# Patient Record
Sex: FEMALE | Race: WHITE | Employment: FULL TIME | URBAN - METROPOLITAN AREA
[De-identification: names, ages, dates, MRNs, and addresses within clinical notes are randomized per-mention and may not be internally consistent; named-entity substitution may affect disease eponyms.]

---

## 2017-01-18 ENCOUNTER — TELEPHONE (OUTPATIENT)
Dept: OBGYN CLINIC | Age: 47
End: 2017-01-18

## 2017-01-18 NOTE — TELEPHONE ENCOUNTER
51year old patient last seen in the office on 4/25/2015 for AE. Patient had appointments scheduled for 4/26/16 and 10/6/16 and cancelled. Patient currently has appointment for 3/10/17 and has called an left a message requesting a refill on her Divigel. [phone] Dayanna Patel for me to refill till scheduled appointment.  Please advise

## 2017-01-19 NOTE — TELEPHONE ENCOUNTER
Patient advised of MD recommendations and was placed on the scheduled for AE 1/20/17 at 11:20 am ( ok per April). Patient verbalized understanding.

## 2017-01-20 ENCOUNTER — OFFICE VISIT (OUTPATIENT)
Dept: OBGYN CLINIC | Age: 47
End: 2017-01-20

## 2017-01-20 VITALS
BODY MASS INDEX: 29.86 KG/M2 | SYSTOLIC BLOOD PRESSURE: 118 MMHG | HEIGHT: 69 IN | DIASTOLIC BLOOD PRESSURE: 74 MMHG | WEIGHT: 201.6 LBS

## 2017-01-20 DIAGNOSIS — Z01.419 ENCOUNTER FOR GYNECOLOGICAL EXAMINATION WITHOUT ABNORMAL FINDING: Primary | ICD-10-CM

## 2017-01-20 DIAGNOSIS — Z79.890 HORMONE REPLACEMENT THERAPY: ICD-10-CM

## 2017-01-20 RX ORDER — ESTRADIOL 1 MG/G
1 GEL TOPICAL DAILY
Qty: 30 PACKET | Refills: 12 | Status: SHIPPED | OUTPATIENT
Start: 2017-01-20 | End: 2018-02-22 | Stop reason: SDUPTHER

## 2017-01-20 NOTE — PROGRESS NOTES
Kamlesh Baer is a ,  51 y.o. female [facility] whose Patient's last menstrual period was 2015. who presents for her annual checkup. She is having no significant problems. Hx LTH/BSO    Hormone Status:    She is not having vasomotor symptoms. The patient is using HRT: Yes, Divigel    Sexual history:    She  reports that she currently engages in sexual activity and has had male partners. She reports using the following method of birth control/protection: Surgical.    Medical conditions:    Since her last annual GYN exam about two years ago, she has had the following changes in her health history: none. Pap and Mammogram History:    Her most recent Pap smear was normal, HPV negative, obtained 14    The patient has her mammogram scheduled at our office in 2017. Breast Cancer History/Substance Abuse:    She has no and a family history of breast cancer. Osteoporosis History:    Family history does not include a first or second degree relative with osteopenia or osteoporosis. A bone density scan was not obtained       Past Medical History   Diagnosis Date    Anxiety 2010    Hypothyroid 2010    Ill-defined condition      depression    IUD (intrauterine device) in place 13     placed for menorrhagia    Migraine 2010    Psychotic disorder      Past Surgical History   Procedure Laterality Date    Hx left salpingectomy       ectopic    Hx tubal ligation      Pr abdomen surgery proc unlisted       laparotomy with appendectomy    Hx orthopaedic Right      heel and wrist    Hx hysterectomy  2015     Davinci LTH/BSO     Tobacco History:  reports that she has been smoking. She has a 16.50 pack-year smoking history. She has never used smokeless tobacco.  Alcohol Abuse:  reports that she drinks alcohol. Drug Abuse:  reports that she does not use illicit drugs.   Current Outpatient Prescriptions   Medication Sig Dispense Refill    DIVIGEL 1 mg (0.1 %) glpk 1 Packet by TransDERmal route daily. 90 Packet 0    levocetirizine (XYZAL) 5 mg tablet take 1 tablet by mouth once daily 90 Tab 3    levothyroxine (SYNTHROID) 75 mcg tablet take 1 tablet by mouth every morning ON AN EMPTY STOMACH 30 Tab 9    fluticasone (FLONASE) 50 mcg/actuation nasal spray 2 Sprays by Both Nostrils route daily. 1 Bottle 5     Allergies: Pcn [penicillins]   Social History     Social History    Marital status:      Spouse name: N/A    Number of children: N/A    Years of education: N/A     Occupational History    Not on file.      Social History Main Topics    Smoking status: Current Every Day Smoker     Packs/day: 0.50     Years: 33.00    Smokeless tobacco: Never Used    Alcohol use Yes      Comment: socially    Drug use: No    Sexual activity: Yes     Partners: Male     Birth control/ protection: Surgical     Other Topics Concern    Not on file     Social History Narrative     Patient Active Problem List   Diagnosis Code    Anxiety F41.9    Migraine G43.909    Hypothyroid E03.9    Menorrhagia N92.0    Endometriosis N80.9       Review of Systems - History obtained from the patient  Constitutional: negative for weight loss, fever, night sweats  HEENT: negative for hearing loss, earache, congestion, snoring, sorethroat  CV: negative for chest pain, palpitations, edema  Resp: negative for cough, shortness of breath, wheezing  GI: negative for change in bowel habits, abdominal pain, black or bloody stools  : negative for frequency, dysuria, hematuria, vaginal discharge  MSK: negative for back pain, joint pain, muscle pain  Breast: negative for breast lumps, nipple discharge, galactorrhea  Skin :negative for itching, rash, hives  Neuro: negative for dizziness, headache, confusion, weakness  Psych: negative for anxiety, depression, change in mood  Heme/lymph: negative for bleeding, bruising, pallor    Physical Exam    Visit Vitals    /74    Ht 5' 9\" (1.753 m)    Wt 201 lb 9.6 oz (91.4 kg)    LMP 05/26/2015    BMI 29.77 kg/m2     Constitutional  · Appearance: well-nourished, well developed, alert, in no acute distress    HENT  · Head and Face: appears normal    Neck  · Inspection/Palpation: normal appearance, no masses or tenderness  · Lymph Nodes: no lymphadenopathy present  · Thyroid: gland size normal, nontender, no nodules or masses present on palpation    Chest  · Respiratory Effort: breathing normal        Auscultation: normal breath sounds    Cardiovascular  · Heart:  · Auscultation: regular rate and rhythm without murmur    Breasts  · Inspection of Breasts: breasts symmetrical, no skin changes, no discharge present, nipple appearance normal, no skin retraction present  · Palpation of Breasts and Axillae: no masses present on palpation, no breast tenderness  · Axillary Lymph Nodes: no lymphadenopathy present    Gastrointestinal  · Abdominal Examination: abdomen non-tender to palpation, normal bowel sounds, no masses present  · Liver and spleen: no hepatomegaly present, spleen not palpable  · Hernias: no hernias identified    Genitourinary  · External Genitalia: normal appearance for age, no discharge present, no tenderness present, no inflammatory lesions present, no masses present, no atrophy present  · Vagina: normal vaginal vault without central or paravaginal defects, no discharge present, no inflammatory lesions present, no masses present  · Bladder: non-tender to palpation  · Urethra: appears normal  · Cervix: absent  · Uterus: absent  · Adnexa: no adnexal tenderness present, no adnexal masses present  · Perineum: perineum within normal limits, no evidence of trauma, no rashes or skin lesions present  · Anus: anus within normal limits, no hemorrhoids present  · Inguinal Lymph Nodes: no lymphadenopathy present      Skin  · General Inspection: no rash, no lesions identified    Neurologic/Psychiatric  · Mental Status:  · Orientation: grossly oriented to person, place and time  · Mood and Affect: mood normal, affect appropriate    . Assessment:  Routine gynecologic examination  Her current medical status is satisfactory with no evidence of significant gynecologic issues.     Plan:  Counseled re: diet, exercise, healthy lifestyle  Return for yearly wellness visits  Rec annual mammogram  Cont Divigel

## 2017-01-20 NOTE — PATIENT INSTRUCTIONS
Breast Self-Exam: Care Instructions  Your Care Instructions  A breast self-exam is when you check your breasts for lumps or changes. This regular exam helps you learn how your breasts normally look and feel. Most breast problems or changes are not because of cancer. Breast self-exam is not a substitute for a mammogram. Having regular breast exams by your doctor and regular mammograms improve your chances of finding any problems with your breasts. Some women set a time each month to do a step-by-step breast self-exam. Other women like a less formal system. They might look at their breasts as they brush their teeth, or feel their breasts once in a while in the shower. If you notice a change in your breast, tell your doctor. Follow-up care is a key part of your treatment and safety. Be sure to make and go to all appointments, and call your doctor if you are having problems. Its also a good idea to know your test results and keep a list of the medicines you take. How do you do a breast self-exam?  · The best time to examine your breasts is usually one week after your menstrual period begins. Your breasts should not be tender then. If you do not have periods, you might do your exam on a day of the month that is easy to remember. · To examine your breasts:  ¨ Remove all your clothes above the waist and lie down. When you are lying down, your breast tissue spreads evenly over your chest wall, which makes it easier to feel all your breast tissue. ¨ Use the pads--not the fingertips--of the 3 middle fingers of your left hand to check your right breast. Move your fingers slowly in small coin-sized circles that overlap. ¨ Use three levels of pressure to feel of all your breast tissue. Use light pressure to feel the tissue close to the skin surface. Use medium pressure to feel a little deeper. Use firm pressure to feel your tissue close to your breastbone and ribs.  Use each pressure level to feel your breast tissue before moving on to the next spot. ¨ Check your entire breast, moving up and down as if following a strip from the collarbone to the bra line, and from the armpit to the ribs. Repeat until you have covered the entire breast.  ¨ Repeat this procedure for your left breast, using the pads of the 3 middle fingers of your right hand. · To examine your breasts while in the shower:  ¨ Place one arm over your head and lightly soap your breast on that side. ¨ Using the pads of your fingers, gently move your hand over your breast (in the strip pattern described above), feeling carefully for any lumps or changes. ¨ Repeat for the other breast.  · Have your doctor inspect anything you notice to see if you need further testing. Where can you learn more? Go to http://ziyad-jl.info/. Enter P148 in the search box to learn more about \"Breast Self-Exam: Care Instructions. \"  Current as of: July 26, 2016  Content Version: 11.1  © 1543-5468 [employer]. Care instructions adapted under license by [employer] (which disclaims liability or warranty for this information). If you have questions about a medical condition or this instruction, always ask your healthcare professional. [place] [address] any warranty or liability for your use of this information.

## 2017-03-09 ENCOUNTER — OFFICE VISIT (OUTPATIENT)
Dept: FAMILY MEDICINE CLINIC | Age: 47
End: 2017-03-09

## 2017-03-09 VITALS
SYSTOLIC BLOOD PRESSURE: 102 MMHG | HEIGHT: 69 IN | OXYGEN SATURATION: 99 % | TEMPERATURE: 98 F | WEIGHT: 202.13 LBS | RESPIRATION RATE: 16 BRPM | DIASTOLIC BLOOD PRESSURE: 76 MMHG | BODY MASS INDEX: 29.94 KG/M2 | HEART RATE: 79 BPM

## 2017-03-09 DIAGNOSIS — Z00.00 WELL ADULT EXAM: Primary | ICD-10-CM

## 2017-03-09 DIAGNOSIS — E03.1 CONGENITAL HYPOTHYROIDISM WITHOUT GOITER: ICD-10-CM

## 2017-03-09 DIAGNOSIS — Z71.6 ENCOUNTER FOR SMOKING CESSATION COUNSELING: ICD-10-CM

## 2017-03-09 DIAGNOSIS — L98.9 SKIN LESION OF HAND: ICD-10-CM

## 2017-03-09 RX ORDER — VARENICLINE TARTRATE 25 MG
KIT ORAL
Qty: 1 DOSE PACK | Refills: 0 | Status: SHIPPED | OUTPATIENT
Start: 2017-03-09 | End: 2018-03-12

## 2017-03-09 RX ORDER — LEVOTHYROXINE SODIUM 75 UG/1
TABLET ORAL
Qty: 90 TAB | Refills: 3 | Status: SHIPPED | OUTPATIENT
Start: 2017-03-09 | End: 2017-04-27 | Stop reason: SDUPTHER

## 2017-03-09 RX ORDER — LEVOCETIRIZINE DIHYDROCHLORIDE 5 MG/1
TABLET, FILM COATED ORAL
Qty: 90 TAB | Refills: 3 | Status: SHIPPED | OUTPATIENT
Start: 2017-03-09 | End: 2018-03-21 | Stop reason: ALTCHOICE

## 2017-03-09 RX ORDER — VARENICLINE TARTRATE 1 MG/1
1 TABLET, FILM COATED ORAL 2 TIMES DAILY
Qty: 30 TAB | Refills: 4 | Status: SHIPPED | OUTPATIENT
Start: 2017-03-09 | End: 2018-03-12

## 2017-03-09 NOTE — PROGRESS NOTES
1. Have you been to the ER, urgent care clinic since your last visit? Hospitalized since your last visit? No    2. Have you seen or consulted any other health care providers outside of the [facility] since your last visit? Include any pap smears or colon screening. No    Chief Complaint   Patient presents with    Follow-up     med ck    Labs     Pt would also like to discuss Chantix.

## 2017-03-09 NOTE — MR AVS SNAPSHOT
Visit Information Date & Time Provider Department Dept. Phone Encounter #  
 3/9/2017  3:30 PM [address] [phone] [id number] Your Appointments 4/21/2017  2:40 PM  
MAMMOGRAPHY with MAMMOGRAM, MERVIN Olivarez ([address]) Appt Note: mammo + ae  TH/bcbs; mammo only/TH pt  
 [address] [facility] [address]  
[facility] [address] [facility] [address] Upcoming Health Maintenance Date Due Pneumococcal 19-64 Medium Risk (1 of 1 - PPSV23) 8/25/1988 DTaP/Tdap/Td series (1 - Tdap) 8/25/1990 BREAST CANCER SCRN MAMMOGRAM 4/24/2016 INFLUENZA AGE 9 TO ADULT 8/1/2016 PAP AKA CERVICAL CYTOLOGY 4/23/2019 Allergies as of 3/9/2017  Review Complete On: 3/9/2017 By: Noman Bowels, LPN Severity Noted Reaction Type Reactions Doxycycline  03/09/2017    Hives, Rash Pcn [Penicillins]  02/25/2010   Systemic Hives Current Immunizations  Never Reviewed No immunizations on file. Not reviewed this visit You Were Diagnosed With   
  
 Codes Comments Well adult exam    -  Primary ICD-10-CM: Z00.00 ICD-9-CM: V70.0 Congenital hypothyroidism without goiter     ICD-10-CM: E03.1 ICD-9-CM: 041 Encounter for smoking cessation counseling     ICD-10-CM: Z71.6, Z72.0 ICD-9-CM: V65.42, 305.1 Skin lesion of hand     ICD-10-CM: L98.9 ICD-9-CM: 709.9 Vitals BP Pulse Temp Resp Height(growth percentile) Weight(growth percentile) 102/76 79 98 °F (36.7 °C) (Oral) 16 5' 9\" (1.753 m) 202 lb 2 oz (91.7 kg) LMP SpO2 BMI OB Status Smoking Status 05/26/2015 99% 29.85 kg/m2 Hysterectomy Current Every Day Smoker Vitals History BMI and BSA Data Body Mass Index Body Surface Area  
 29.85 kg/m 2 2.11 m 2 Preferred Pharmacy Pharmacy Name Phone [facility]Y #[id number] [phone] Your Updated Medication List  
  
   
This list is accurate as of: 3/9/17  3:55 PM.  Always use your most recent med list.  
  
  
  
  
 DIVIGEL 1 mg/gram (0.1 %) Glpk Generic drug:  Estradiol 1 Packet by TransDERmal route daily. levocetirizine 5 mg tablet Commonly known as:  XYZAL  
take 1 tablet by mouth once daily  
  
 levothyroxine 75 mcg tablet Commonly known as:  SYNTHROID  
take 1 tablet by mouth every morning ON AN EMPTY STOMACH * varenicline 0.5 mg (11)- 1 mg (42) Dspk Commonly known as:  CHANTIX STARTER MOR As directed * varenicline 1 mg tablet Commonly known as:  [address] Take 1 Tab by mouth two (2) times a day. * Notice: This list has 2 medication(s) that are the same as other medications prescribed for you. Read the directions carefully, and ask your doctor or other care provider to review them with you. Prescriptions Printed Refills  
 varenicline (CHANTIX CONTINUING MONTH BOX) 1 mg tablet 4 Sig: Take 1 Tab by mouth two (2) times a day. Class: Print Route: Oral  
  
Prescriptions Sent to Pharmacy Refills  
 levothyroxine (SYNTHROID) 75 mcg tablet 3 Sig: take 1 tablet by mouth every morning ON AN EMPTY STOMACH Class: Normal  
 Pharmacy: [facility]/pharmacy #[id number] Ph #: [phone]  
 levocetirizine (XYZAL) 5 mg tablet 3 Sig: take 1 tablet by mouth once daily Class: Normal  
 Pharmacy: [facility]/pharmacy #[id number] Ph #: [phone]  
 varenicline (CHANTIX STARTER MOR) 0.5 mg (11)- 1 mg (42) DsPk 0 Sig: As directed Class: Normal  
 Pharmacy: [id number], [address] [place] [address] Ph #: [phone] We Performed the Following CBC WITH AUTOMATED DIFF [23307 CPT(R)] LIPID PANEL [25523 CPT(R)] METABOLIC PANEL, COMPREHENSIVE [60534 CPT(R)] REFERRAL TO DERMATOLOGY [REF19 Custom] Comments:  
 Please evaluate patient for right hand lesion. TSH 3RD GENERATION [47453 CPT(R)] Referral Information Referral ID Referred By Referred To  
  
 [id number] Edmundo, [address] [facility] Marjoriecurt MD Lobo   
   [address] Phone: [phone] Fax: [phone] Visits Status Start Date End Date Dhiraj Guidry 1154 Request 3/9/17 3/9/18 If your referral has a status of pending review or denied, additional information will be sent to support the outcome of this decision. Introducing [facility]! Dear Chandler Alcala: 
Thank you for requesting a [employer] account. Our records indicate that you already have an active [employer] account. You can access your account anytime at [email or web address] Did you know that you can access your hospital and ER discharge instructions at any time in [employer]? You can also review all of your test results from your hospital stay or ER visit. Additional Information If you have questions, please visit the Frequently Asked Questions section of the [employer] website at [email or web address] Remember, [employer] is NOT to be used for urgent needs. For medical emergencies, dial 911. Now available from your iPhone and Android! Please provide this summary of care documentation to your next provider. Your primary care clinician is listed as Lisa Ruiz. If you have any questions after today's visit, please call [phone].

## 2017-03-10 LAB
ALBUMIN SERPL-MCNC: 4.1 G/DL (ref 3.5–5.5)
ALBUMIN/GLOB SERPL: 2.1 {RATIO} (ref 1.1–2.5)
ALP SERPL-CCNC: 71 IU/L (ref 39–117)
ALT SERPL-CCNC: 6 IU/L (ref 0–32)
AST SERPL-CCNC: 13 IU/L (ref 0–40)
BASOPHILS # BLD AUTO: 0.1 X10E3/UL (ref 0–0.2)
BASOPHILS NFR BLD AUTO: 1 %
BILIRUB SERPL-MCNC: <0.2 MG/DL (ref 0–1.2)
BUN SERPL-MCNC: 14 MG/DL (ref 6–24)
BUN/CREAT SERPL: 17 (ref 9–23)
CALCIUM SERPL-MCNC: 8.7 MG/DL (ref 8.7–10.2)
CHLORIDE SERPL-SCNC: 102 MMOL/L (ref 96–106)
CHOLEST SERPL-MCNC: 165 MG/DL (ref 100–199)
CO2 SERPL-SCNC: 22 MMOL/L (ref 18–29)
CREAT SERPL-MCNC: 0.82 MG/DL (ref 0.57–1)
EOSINOPHIL # BLD AUTO: 0.2 X10E3/UL (ref 0–0.4)
EOSINOPHIL NFR BLD AUTO: 3 %
ERYTHROCYTE [DISTWIDTH] IN BLOOD BY AUTOMATED COUNT: 13.8 % (ref 12.3–15.4)
GLOBULIN SER CALC-MCNC: 2 G/DL (ref 1.5–4.5)
GLUCOSE SERPL-MCNC: 93 MG/DL (ref 65–99)
HCT VFR BLD AUTO: 37 % (ref 34–46.6)
HDLC SERPL-MCNC: 33 MG/DL
HGB BLD-MCNC: 12.1 G/DL (ref 11.1–15.9)
IMM GRANULOCYTES # BLD: 0 X10E3/UL (ref 0–0.1)
IMM GRANULOCYTES NFR BLD: 0 %
INTERPRETATION, 910389: NORMAL
LDLC SERPL CALC-MCNC: 96 MG/DL (ref 0–99)
LYMPHOCYTES # BLD AUTO: 2.2 X10E3/UL (ref 0.7–3.1)
LYMPHOCYTES NFR BLD AUTO: 36 %
MCH RBC QN AUTO: 29.7 PG (ref 26.6–33)
MCHC RBC AUTO-ENTMCNC: 32.7 G/DL (ref 31.5–35.7)
MCV RBC AUTO: 91 FL (ref 79–97)
MONOCYTES # BLD AUTO: 0.3 X10E3/UL (ref 0.1–0.9)
MONOCYTES NFR BLD AUTO: 5 %
NEUTROPHILS # BLD AUTO: 3.3 X10E3/UL (ref 1.4–7)
NEUTROPHILS NFR BLD AUTO: 55 %
PLATELET # BLD AUTO: 264 X10E3/UL (ref 150–379)
POTASSIUM SERPL-SCNC: 4.6 MMOL/L (ref 3.5–5.2)
PROT SERPL-MCNC: 6.1 G/DL (ref 6–8.5)
RBC # BLD AUTO: 4.08 X10E6/UL (ref 3.77–5.28)
SODIUM SERPL-SCNC: 139 MMOL/L (ref 134–144)
TRIGL SERPL-MCNC: 179 MG/DL (ref 0–149)
TSH SERPL DL<=0.005 MIU/L-ACNC: 6.49 UIU/ML (ref 0.45–4.5)
VLDLC SERPL CALC-MCNC: 36 MG/DL (ref 5–40)
WBC # BLD AUTO: 6 X10E3/UL (ref 3.4–10.8)

## 2017-03-14 NOTE — PROGRESS NOTES
Hey there, your labs look great but your thyroid is slow. Did you run out of thyroid med?   Leanne Hahn

## 2017-03-15 NOTE — PROGRESS NOTES
Subjective:   51 y.o. female for Well Woman Check. Her gyne and breast care is done elsewhere by her Ob-Gyne physician. Patient Active Problem List    Diagnosis Date Noted    Menorrhagia 05/27/2015    Endometriosis 05/27/2015    Hypothyroid 08/26/2010    Anxiety 02/25/2010    Migraine 02/25/2010     Current Outpatient Prescriptions   Medication Sig Dispense Refill    levothyroxine (SYNTHROID) 75 mcg tablet take 1 tablet by mouth every morning ON AN EMPTY STOMACH 90 Tab 3    levocetirizine (XYZAL) 5 mg tablet take 1 tablet by mouth once daily 90 Tab 3    varenicline (CHANTIX STARTER MOR) 0.5 mg (11)- 1 mg (42) DsPk As directed 1 Dose Pack 0    varenicline (CHANTIX CONTINUING MONTH BOX) 1 mg tablet Take 1 Tab by mouth two (2) times a day. 30 Tab 4    DIVIGEL 1 mg (0.1 %) glpk 1 Packet by TransDERmal route daily. 27 Packet 12     Family History   Problem Relation Age of Onset    Hypertension Mother     Migraines Mother     Kidney Disease Father      Social History   Substance Use Topics    Smoking status: Current Every Day Smoker     Packs/day: 0.50     Years: 33.00    Smokeless tobacco: Never Used    Alcohol use Yes      Comment: socially             ROS: Feeling generally well. No TIA's or unusual headaches, no dysphagia. No prolonged cough. No dyspnea or chest pain on exertion. No abdominal pain, change in bowel habits, black or bloody stools. No urinary tract symptoms. No new or unusual musculoskeletal symptoms. Specific concerns today: she wants to quit smoking with Chantix. She needs a referral to [facility] for skin lesion of the hand. Objective: The patient appears well, alert, oriented x 3, in no distress. Visit Vitals    /76    Pulse 79    Temp 98 °F (36.7 °C) (Oral)    Resp 16    Ht 5' 9\" (1.753 m)    Wt 202 lb 2 oz (91.7 kg)    LMP 05/26/2015    SpO2 99%    BMI 29.85 kg/m2     ENT normal.  Neck supple. No adenopathy or thyromegaly. JENNIFER.  Lungs are clear, good air entry, no wheezes, rhonchi or rales. S1 and S2 normal, no murmurs, regular rate and rhythm. Abdomen soft without tenderness, guarding, mass or organomegaly. Extremities show no edema, normal peripheral pulses. Neurological is normal, no focal findings. Breast and Pelvic exams are deferred. Assessment/Plan:   Well Woman  lose weight, increase physical activity, follow low fat diet, follow low salt diet, routine labs ordered  Encounter Diagnoses   Name Primary?  Well adult exam Yes    Congenital hypothyroidism without goiter     Encounter for smoking cessation counseling     Skin lesion of hand      Orders Placed This Encounter    CBC WITH AUTOMATED DIFF    LIPID PANEL    METABOLIC PANEL, COMPREHENSIVE    TSH 3RD GENERATION    CVD REPORT    REFERRAL TO DERMATOLOGY    levothyroxine (SYNTHROID) 75 mcg tablet    levocetirizine (XYZAL) 5 mg tablet    varenicline (CHANTIX STARTER MOR) 0.5 mg (11)- 1 mg (42) DsPk    varenicline (CHANTIX CONTINUING MONTH BOX) 1 mg tablet     Labs and refills updated  Given Starter and Cont pack of Chantix with directions for use  Dev plan with resultls    I have discussed the diagnosis with the patient and the intended plan as seen in the above orders. The patient has received an after-visit summary and questions were answered concerning future plans. Patient conveyed understanding of the plan at the time of the visit.     Haider Martin, MSN, ANP  3/15/2017

## 2017-04-27 RX ORDER — LEVOTHYROXINE SODIUM 75 UG/1
TABLET ORAL
Qty: 90 TAB | Refills: 0 | Status: SHIPPED | OUTPATIENT
Start: 2017-04-27 | End: 2018-01-29 | Stop reason: SDUPTHER

## 2018-01-29 ENCOUNTER — OFFICE VISIT (OUTPATIENT)
Dept: FAMILY MEDICINE CLINIC | Age: 48
End: 2018-01-29

## 2018-01-29 VITALS
HEART RATE: 71 BPM | WEIGHT: 215.25 LBS | DIASTOLIC BLOOD PRESSURE: 82 MMHG | OXYGEN SATURATION: 98 % | BODY MASS INDEX: 31.88 KG/M2 | SYSTOLIC BLOOD PRESSURE: 112 MMHG | RESPIRATION RATE: 16 BRPM | TEMPERATURE: 98.5 F | HEIGHT: 69 IN

## 2018-01-29 DIAGNOSIS — M25.512 CHRONIC LEFT SHOULDER PAIN: ICD-10-CM

## 2018-01-29 DIAGNOSIS — G89.29 CHRONIC LEFT SHOULDER PAIN: ICD-10-CM

## 2018-01-29 DIAGNOSIS — E03.1 CONGENITAL HYPOTHYROIDISM WITHOUT GOITER: Primary | ICD-10-CM

## 2018-01-29 RX ORDER — BACLOFEN 10 MG/1
10 TABLET ORAL 3 TIMES DAILY
Qty: 60 TAB | Refills: 1 | Status: SHIPPED | OUTPATIENT
Start: 2018-01-29 | End: 2018-05-09

## 2018-01-29 RX ORDER — LEVOTHYROXINE SODIUM 112 UG/1
112 TABLET ORAL
Qty: 30 TAB | Refills: 1 | Status: SHIPPED | OUTPATIENT
Start: 2018-01-29 | End: 2018-03-26 | Stop reason: SDUPTHER

## 2018-01-29 RX ORDER — DICLOFENAC SODIUM 75 MG/1
75 TABLET, DELAYED RELEASE ORAL 2 TIMES DAILY
Qty: 60 TAB | Refills: 1 | Status: SHIPPED | OUTPATIENT
Start: 2018-01-29 | End: 2018-03-21

## 2018-01-29 NOTE — MR AVS SNAPSHOT
[address] [place] [address] 
[phone] Patient: Gutierrez Herzog MRN: [id number] Olga Awilda Visit Information Date & Time Provider Department Dept. Phone Encounter #  
 1/29/2018  4:00 PM Tommy Chapa [phone] [id number] Upcoming Health Maintenance Date Due Pneumococcal 19-64 Medium Risk (1 of 1 - PPSV23) 8/25/1988 DTaP/Tdap/Td series (1 - Tdap) 8/25/1990 BREAST CANCER SCRN MAMMOGRAM 4/24/2016 Influenza Age 5 to Adult 8/1/2017 PAP AKA CERVICAL CYTOLOGY 4/23/2019 Allergies as of 1/29/2018  Review Complete On: 1/29/2018 By: Aureliano Severs, LPN Severity Noted Reaction Type Reactions Doxycycline  03/09/2017    Hives, Rash Pcn [Penicillins]  02/25/2010   Systemic Hives Current Immunizations  Never Reviewed No immunizations on file. Not reviewed this visit You Were Diagnosed With   
  
 Codes Comments Congenital hypothyroidism without goiter    -  Primary ICD-10-CM: E03.1 ICD-9-CM: 969 Chronic left shoulder pain     ICD-10-CM: M25.512, G89.29 ICD-9-CM: 719.41, 338.29 Vitals BP Pulse Temp Resp Height(growth percentile) Weight(growth percentile) 112/82 71 98.5 °F (36.9 °C) (Oral) 16 5' 9\" (1.753 m) 215 lb 4 oz (97.6 kg) LMP SpO2 BMI OB Status Smoking Status 05/26/2015 98% 31.79 kg/m2 Hysterectomy Former Smoker Vitals History BMI and BSA Data Body Mass Index Body Surface Area 31.79 kg/m 2 2.18 m 2 Preferred Pharmacy Pharmacy Name Phone [id number] [place], [address] [facility] [phone] Your Updated Medication List  
  
   
This list is accurate as of: 1/29/18  4:08 PM.  Always use your most recent med list.  
  
  
  
  
 baclofen 10 mg tablet Commonly known as:  LIORESAL Take 1 Tab by mouth three (3) times daily. As needed for spasm diclofenac EC 75 mg EC tablet Commonly known as:  VOLTAREN Take 1 Tab by mouth two (2) times a day. DIVIGEL 1 mg/gram (0.1 %) Glpk Generic drug:  Estradiol 1 Packet by TransDERmal route daily. levocetirizine 5 mg tablet Commonly known as:  XYZAL  
take 1 tablet by mouth once daily  
  
 levothyroxine 112 mcg tablet Commonly known as:  SYNTHROID Take 1 Tab by mouth Daily (before breakfast). * varenicline 0.5 mg (11)- 1 mg (42) Dspk Commonly known as:  CHANTIX STARTER MOR As directed * varenicline 1 mg tablet Commonly known as:  [address] Take 1 Tab by mouth two (2) times a day. * Notice: This list has 2 medication(s) that are the same as other medications prescribed for you. Read the directions carefully, and ask your doctor or other care provider to review them with you. Prescriptions Sent to Pharmacy Refills  
 levothyroxine (SYNTHROID) 112 mcg tablet 1 Sig: Take 1 Tab by mouth Daily (before breakfast). Class: Normal  
 Pharmacy: [facility]/pharmacy #[id number] Ph #: [phone] Route: Oral  
 baclofen (LIORESAL) 10 mg tablet 1 Sig: Take 1 Tab by mouth three (3) times daily. As needed for spasm Class: Normal  
 Pharmacy: [facility]/pharmacy #[id number] Ph #: [phone] Route: Oral  
 diclofenac EC (VOLTAREN) 75 mg EC tablet 1 Sig: Take 1 Tab by mouth two (2) times a day. Class: Normal  
 Pharmacy: [facility]/pharmacy #[id number] Ph #: [phone] Route: Oral  
  
Introducing [facility]! Dear Nam Shukla: 
Thank you for requesting a [employer] account. Our records indicate that you already have an active [email or web address] account. You can access your account anytime at [email or web address] Did you know that you can access your hospital and ER discharge instructions at any time in [employer]? You can also review all of your test results from your hospital stay or ER visit. Additional Information If you have questions, please visit the Frequently Asked Questions section of the [employer] website at [email or web address] Remember, [employer] is NOT to be used for urgent needs. For medical emergencies, dial 911. Now available from your iPhone and Android! Please provide this summary of care documentation to your next provider. Your primary care clinician is listed as Nisha Rivers. If you have any questions after today's visit, please call [phone].

## 2018-01-29 NOTE — PROGRESS NOTES
1. Have you been to the ER, urgent care clinic since your last visit? Hospitalized since your last visit? No    2. Have you seen or consulted any other health care providers outside of the [address] [place] [address] since your last visit? Include any pap smears or colon screening. No    Chief Complaint   Patient presents with    Follow-up     10 mo f/u, med ck    Labs     not fasting    Shoulder Pain     left & radiates into arm x 8 yrs     Pt states she has left shoulder pain that radiates into left arm x 8 yrs.

## 2018-01-30 NOTE — PROGRESS NOTES
HISTORY OF PRESENT ILLNESS  Samantha Currie is a 49 y.o. female. HPI  Patient has lab report from employer wellness visit  Copy scanned for chart  It shows that her tsh is >16, she has had weight gain and fatigue  Did not miss any pills  Currently on 75mg a day    She is having chronic left shoulder pain  Occurs behind the shoulder blade and the side of the shoulder  Does not want large work up at this time  Not using any nsaids for pain    ROS  A comprehensive review of system was obtained and negative except findings in the HPI    Visit Vitals    /82    Pulse 71    Temp 98.5 °F (36.9 °C) (Oral)    Resp 16    Ht 5' 9\" (1.753 m)    Wt 215 lb 4 oz (97.6 kg)    LMP 05/26/2015    SpO2 98%    BMI 31.79 kg/m2     Physical Exam   Constitutional: She is oriented to person, place, and time. She appears well-developed and well-nourished. Neck: No JVD present. Cardiovascular: Normal rate, regular rhythm and intact distal pulses. Exam reveals no gallop and no friction rub. No murmur heard. Pulmonary/Chest: Effort normal and breath sounds normal. No respiratory distress. She has no wheezes. Musculoskeletal: She exhibits no edema. Neurological: She is alert and oriented to person, place, and time. Skin: Skin is warm. Nursing note and vitals reviewed. ASSESSMENT and PLAN  Encounter Diagnoses   Name Primary?  Congenital hypothyroidism without goiter Yes    Chronic left shoulder pain      Orders Placed This Encounter    levothyroxine (SYNTHROID) 112 mcg tablet    baclofen (LIORESAL) 10 mg tablet    diclofenac EC (VOLTAREN) 75 mg EC tablet     Given voltaren bid and baclofen tid, reviewed adr/se of med and what to expect  Increase thyroid dose to 112mcg and recheck 6 weeks    I have discussed the diagnosis with the patient and the intended plan as seen in the above orders. The patient has received an after-visit summary and questions were answered concerning future plans.  Patient conveyed understanding of the plan at the time of the visit.     Mateo Severin, MSN, ANP  1/29/2018

## 2018-02-22 ENCOUNTER — TELEPHONE (OUTPATIENT)
Dept: OBGYN CLINIC | Age: 48
End: 2018-02-22

## 2018-02-22 DIAGNOSIS — Z79.890 HORMONE REPLACEMENT THERAPY: ICD-10-CM

## 2018-02-22 RX ORDER — ESTRADIOL 1 MG/G
1 GEL TOPICAL DAILY
Qty: 30 PACKET | Refills: 1 | Status: SHIPPED | OUTPATIENT
Start: 2018-02-22 | End: 2018-05-10 | Stop reason: SDUPTHER

## 2018-02-22 NOTE — TELEPHONE ENCOUNTER
Patient's prescription for Divigel  1 mg gel packets was denied due to need for AE. Needs one month supply since she is completely out of RX.         Patient has made [address] appointment for 4/3/18 with [facility] [place]

## 2018-02-22 NOTE — TELEPHONE ENCOUNTER
Call received at 813am    49year old patient last seen in the office on 1/20/17 and has appointment on  4/3/18. Patient calling regarding her prescription for Divigel . This nurse advised of prescription sent this am with receipt confirmed by pharmacy. Patient verbalized understanding.

## 2018-03-12 ENCOUNTER — OFFICE VISIT (OUTPATIENT)
Dept: FAMILY MEDICINE CLINIC | Age: 48
End: 2018-03-12

## 2018-03-12 VITALS
TEMPERATURE: 98.4 F | OXYGEN SATURATION: 98 % | BODY MASS INDEX: 31.1 KG/M2 | SYSTOLIC BLOOD PRESSURE: 118 MMHG | HEART RATE: 110 BPM | WEIGHT: 210 LBS | DIASTOLIC BLOOD PRESSURE: 80 MMHG | HEIGHT: 69 IN | RESPIRATION RATE: 15 BRPM

## 2018-03-12 DIAGNOSIS — I48.91 ATRIAL FIBRILLATION, UNSPECIFIED TYPE (HCC): ICD-10-CM

## 2018-03-12 DIAGNOSIS — E03.1 CONGENITAL HYPOTHYROIDISM WITHOUT GOITER: ICD-10-CM

## 2018-03-12 DIAGNOSIS — M79.10 MYALGIA: ICD-10-CM

## 2018-03-12 DIAGNOSIS — I49.9 IRREGULAR HEART RATE: ICD-10-CM

## 2018-03-12 DIAGNOSIS — Z00.00 WELL ADULT EXAM: Primary | ICD-10-CM

## 2018-03-12 RX ORDER — METOPROLOL SUCCINATE 25 MG/1
25 TABLET, EXTENDED RELEASE ORAL DAILY
Qty: 30 TAB | Refills: 2 | Status: SHIPPED | OUTPATIENT
Start: 2018-03-12 | End: 2018-06-06 | Stop reason: SDUPTHER

## 2018-03-12 RX ORDER — GUAIFENESIN 100 MG/5ML
81 LIQUID (ML) ORAL DAILY
COMMUNITY
Start: 2018-03-12 | End: 2018-05-09

## 2018-03-12 NOTE — MR AVS SNAPSHOT
[address] [address] 
[phone] Patient: Luis Shows MRN: [id number] Akhil Escobedo Visit Information Date & Time Provider Department Dept. Phone Encounter #  
 3/12/2018  9:30 AM Moris Burciaga NP [address] [facility] [phone] [id number] Your Appointments 4/3/2018 10:20 AM  
MAMMOGRAPHY with MAMMELY, MERVIN Olivarez ([facility] Appt Note: mammo + ae  TH/bcbs [address] [facility] [address]  
[phone]  
  
   
 [address]  
  
    
 4/3/2018 10:40 AM  
ESTABLISHED PATIENT with MD José Luis Paez ([facility]) Appt Note: mammo + ae  TH/bcbs [address] [facility] [address] [facility] [address] [address]  
[address] 70 [facility] Upcoming Health Maintenance Date Due DTaP/Tdap/Td series (1 - Tdap) 8/25/1990 BREAST CANCER SCRN MAMMOGRAM 4/24/2016 Influenza Age 5 to Adult 8/1/2017 PAP AKA CERVICAL CYTOLOGY 4/23/2019 Allergies as of 3/12/2018  Review Complete On: 3/12/2018 By: Moris Burciaga NP Severity Noted Reaction Type Reactions Doxycycline  03/09/2017    Hives, Rash Pcn [Penicillins]  02/25/2010   Systemic Hives Current Immunizations  Never Reviewed No immunizations on file. Not reviewed this visit You Were Diagnosed With   
  
 Codes Comments Well adult exam    -  Primary ICD-10-CM: Z00.00 ICD-9-CM: V70.0 Congenital hypothyroidism without goiter     ICD-10-CM: E03.1 ICD-9-CM: 372 Myalgia     ICD-10-CM: M79.1 ICD-9-CM: 729.1 Irregular heart rate     ICD-10-CM: I49.9 ICD-9-CM: 427.9 Atrial fibrillation, unspecified type ([facility]     ICD-10-CM: I48.91 
ICD-9-CM: 427.31 Vitals BP Pulse Temp Resp Height(growth percentile) Weight(growth percentile) 118/80 (!) 110 98.4 °F (36.9 °C) 15 5' 9\" (1.753 m) 210 lb (95.3 kg) LMP SpO2 BMI OB Status Smoking Status 05/26/2015 98% 31.01 kg/m2 Hysterectomy Former Smoker Vitals History BMI and BSA Data Body Mass Index Body Surface Area 31.01 kg/m 2 2.15 m 2 Preferred Pharmacy Pharmacy Name Phone [facility][id number]- [place], [address] [place] [address] [phone] Your Updated Medication List  
  
   
This list is accurate as of 3/12/18 10:17 AM.  Always use your most recent med list.  
  
  
  
  
 aspirin 81 mg chewable tablet Take 1 Tab by mouth daily. baclofen 10 mg tablet Commonly known as:  LIORESAL Take 1 Tab by mouth three (3) times daily. As needed for spasm  
  
 diclofenac EC 75 mg EC tablet Commonly known as:  VOLTAREN Take 1 Tab by mouth two (2) times a day. DIVIGEL 1 mg/gram (0.1 %) Glpk Generic drug:  Estradiol 1 Packet by TransDERmal route daily. levocetirizine 5 mg tablet Commonly known as:  XYZAL  
take 1 tablet by mouth once daily  
  
 levothyroxine 112 mcg tablet Commonly known as:  SYNTHROID Take 1 Tab by mouth Daily (before breakfast). metoprolol succinate 25 mg XL tablet Commonly known as:  TOPROL-XL Take 1 Tab by mouth daily. Prescriptions Sent to Pharmacy Refills  
 metoprolol succinate (TOPROL-XL) 25 mg XL tablet 2 Sig: Take 1 Tab by mouth daily. Class: Normal  
 Pharmacy: [facility]/pharmacy #[id number]- [place], [address] [place] [address] Ph #: [phone] Route: Oral  
  
We Performed the Following AMB POC EKG ROUTINE W/ 12 LEADS, INTER & REP [27156 CPT(R)] CBC WITH AUTOMATED DIFF [19376 CPT(R)] LIPID PANEL [45038 CPT(R)] MAGNESIUM W8843379 CPT(R)] METABOLIC PANEL, COMPREHENSIVE [30363 CPT(R)] REFERRAL TO CARDIOLOGY [FJV44 Custom] TSH 3RD GENERATION [84158 CPT(R)] Referral Information Referral ID Referred By Referred To  
  
 [id number] Alayna Wyatt MD   
   [address] [facility] [address] [place] [address] Phone: [phone] Fax: [phone] Visits Status Start Date End Date 1 New Request 3/12/18 3/12/19 If your referral has a status of pending review or denied, additional information will be sent to support the outcome of this decision. Introducing [facility] & HEALTH SERVICES! Dear Harry Vincent: 
Thank you for requesting a [employer] account. Our records indicate that you already have an active [employer] account. You can access your account anytime at [email or web address] Did you know that you can access your hospital and ER discharge instructions at any time in [employer]? You can also review all of your test results from your hospital stay or ER visit. Additional Information If you have questions, please visit the Frequently Asked Questions section of the [employer] website at [email or web address] Remember, [employer] is NOT to be used for urgent needs. For medical emergencies, dial 911. Now available from your iPhone and Android! Please provide this summary of care documentation to your next provider. Your primary care clinician is listed as Alyx Sher. If you have any questions after today's visit, please call [phone].

## 2018-03-12 NOTE — PROGRESS NOTES
Chief Complaint   Patient presents with    Thyroid Problem     6 week follow up    Leg Pain     bilateral leg cramps x2  weeks. states that at one point it was so bad that she could barely walk. 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

## 2018-03-12 NOTE — PROGRESS NOTES
Subjective:   49 y.o. female for Well Woman Check. Her gyne and breast care is done elsewhere by her Ob-Gyne physician. Patient Active Problem List    Diagnosis Date Noted    Atrial fibrillation ([facility] [address] 03/12/2018    Menorrhagia 05/27/2015    Endometriosis 05/27/2015    Hypothyroid 08/26/2010    Anxiety 02/25/2010    Migraine 02/25/2010     Current Outpatient Prescriptions   Medication Sig Dispense Refill    metoprolol succinate (TOPROL-XL) 25 mg XL tablet Take 1 Tab by mouth daily. 30 Tab 2    aspirin 81 mg chewable tablet Take 1 Tab by mouth daily.  DIVIGEL 1 mg/gram (0.1 %) glpk 1 Packet by TransDERmal route daily. 30 Packet 1    levothyroxine (SYNTHROID) 112 mcg tablet Take 1 Tab by mouth Daily (before breakfast). 30 Tab 1    baclofen (LIORESAL) 10 mg tablet Take 1 Tab by mouth three (3) times daily. As needed for spasm 60 Tab 1    diclofenac EC (VOLTAREN) 75 mg EC tablet Take 1 Tab by mouth two (2) times a day. 60 Tab 1    levocetirizine (XYZAL) 5 mg tablet take 1 tablet by mouth once daily [address] Tab 3     Family History   Problem Relation Age of Onset    Hypertension Mother     Migraines Mother     Kidney Disease Father      Social History   Substance Use Topics    Smoking status: Former Smoker     Packs/day: 0.50     Years: 33.00     Quit date: 3/28/2017    Smokeless tobacco: Never Used    Alcohol use Yes      Comment: socially             ROS: Feeling generally well. No TIA's or unusual headaches, no dysphagia. No prolonged cough. No dyspnea or chest pain on exertion. No abdominal pain, change in bowel habits, black or bloody stools. No urinary tract symptoms. No new or unusual musculoskeletal symptoms. Specific concerns today: having severe leg cramps at night, getting worse, she thinks eating bananas makes it worse. She is having palpitations intermittently as well. Objective: The patient appears well, alert, oriented x 3, in no distress.   Visit Vitals    /80    Pulse (!) 110    Temp 98.4 °F (36.9 °C)    Resp 15    Ht 5' 9\" (1.753 m)    Wt 210 lb (95.3 kg)    LMP 05/26/2015    SpO2 98%    BMI 31.01 kg/m2     ENT normal.  Neck supple. No adenopathy or thyromegaly. JENNIFER. Lungs are clear, good air entry, no wheezes, rhonchi or rales. S1 and S2 normal, no murmurs, regular rate and rhythm. Abdomen soft without tenderness, guarding, mass or organomegaly. Extremities show no edema, normal peripheral pulses. Neurological is normal, no focal findings. Breast and Pelvic exams are deferred. EKG: new onset afib noted    Assessment/Plan:   Well Woman  lose weight, increase physical activity, follow low fat diet, follow low salt diet, routine labs ordered, referral to cardio given for appt asap. Start toprol xl 25mg to help with palps and ASA 81mg as well. Encounter Diagnoses   Name Primary?  Well adult exam Yes    Congenital hypothyroidism without goiter     Myalgia     Irregular heart rate     Atrial fibrillation, unspecified type ([address]      Orders Placed This Encounter    CBC WITH AUTOMATED DIFF    LIPID PANEL    METABOLIC PANEL, COMPREHENSIVE    TSH 3RD GENERATION    MAGNESIUM    [facility]    AMB POC EKG ROUTINE W/ 12 LEADS, INTER & REP    metoprolol succinate (TOPROL-XL) 25 mg XL tablet    aspirin 81 mg chewable tablet     I have discussed the diagnosis with the patient and the intended plan as seen in the above orders. The patient has received an after-visit summary and questions were answered concerning future plans. Patient conveyed understanding of the plan at the time of the visit.     Kieran Robertson, MSN, ANP  3/12/2018

## 2018-03-13 LAB
ALBUMIN SERPL-MCNC: 4.3 G/DL (ref 3.5–5.5)
ALBUMIN/GLOB SERPL: 1.8 {RATIO} (ref 1.2–2.2)
ALP SERPL-CCNC: 80 IU/L (ref 39–117)
ALT SERPL-CCNC: 10 IU/L (ref 0–32)
AST SERPL-CCNC: 14 IU/L (ref 0–40)
BASOPHILS # BLD AUTO: 0 X10E3/UL (ref 0–0.2)
BASOPHILS NFR BLD AUTO: 1 %
BILIRUB SERPL-MCNC: <0.2 MG/DL (ref 0–1.2)
BUN SERPL-MCNC: 20 MG/DL (ref 6–24)
BUN/CREAT SERPL: 17 (ref 9–23)
CALCIUM SERPL-MCNC: 9 MG/DL (ref 8.7–10.2)
CHLORIDE SERPL-SCNC: 102 MMOL/L (ref 96–106)
CHOLEST SERPL-MCNC: 187 MG/DL (ref 100–199)
CO2 SERPL-SCNC: 24 MMOL/L (ref 18–29)
CREAT SERPL-MCNC: 1.19 MG/DL (ref 0.57–1)
EOSINOPHIL # BLD AUTO: 0.2 X10E3/UL (ref 0–0.4)
EOSINOPHIL NFR BLD AUTO: 3 %
ERYTHROCYTE [DISTWIDTH] IN BLOOD BY AUTOMATED COUNT: 13.1 % (ref 12.3–15.4)
GFR SERPLBLD CREATININE-BSD FMLA CKD-EPI: 54 ML/MIN/1.73
GFR SERPLBLD CREATININE-BSD FMLA CKD-EPI: 62 ML/MIN/1.73
GLOBULIN SER CALC-MCNC: 2.4 G/DL (ref 1.5–4.5)
GLUCOSE SERPL-MCNC: 109 MG/DL (ref 65–99)
HCT VFR BLD AUTO: 40.4 % (ref 34–46.6)
HDLC SERPL-MCNC: 44 MG/DL
HGB BLD-MCNC: 13.5 G/DL (ref 11.1–15.9)
IMM GRANULOCYTES # BLD: 0 X10E3/UL (ref 0–0.1)
IMM GRANULOCYTES NFR BLD: 0 %
INTERPRETATION, 910389: NORMAL
INTERPRETATION: NORMAL
LDLC SERPL CALC-MCNC: 118 MG/DL (ref 0–99)
LYMPHOCYTES # BLD AUTO: 2.3 X10E3/UL (ref 0.7–3.1)
LYMPHOCYTES NFR BLD AUTO: 39 %
MAGNESIUM SERPL-MCNC: 2 MG/DL (ref 1.6–2.3)
MCH RBC QN AUTO: 29.4 PG (ref 26.6–33)
MCHC RBC AUTO-ENTMCNC: 33.4 G/DL (ref 31.5–35.7)
MCV RBC AUTO: 88 FL (ref 79–97)
MONOCYTES # BLD AUTO: 0.3 X10E3/UL (ref 0.1–0.9)
MONOCYTES NFR BLD AUTO: 5 %
NEUTROPHILS # BLD AUTO: 3.1 X10E3/UL (ref 1.4–7)
NEUTROPHILS NFR BLD AUTO: 52 %
PDF IMAGE, 910387: NORMAL
PLATELET # BLD AUTO: 297 X10E3/UL (ref 150–379)
POTASSIUM SERPL-SCNC: 4 MMOL/L (ref 3.5–5.2)
PROT SERPL-MCNC: 6.7 G/DL (ref 6–8.5)
RBC # BLD AUTO: 4.59 X10E6/UL (ref 3.77–5.28)
SODIUM SERPL-SCNC: 142 MMOL/L (ref 134–144)
TRIGL SERPL-MCNC: 126 MG/DL (ref 0–149)
TSH SERPL DL<=0.005 MIU/L-ACNC: 1.83 UIU/ML (ref 0.45–4.5)
VLDLC SERPL CALC-MCNC: 25 MG/DL (ref 5–40)
WBC # BLD AUTO: 5.9 X10E3/UL (ref 3.4–10.8)

## 2018-03-19 NOTE — PROGRESS NOTES
Hey there, all of your labs look great, they will be visible to the cardiologist as well for your appointment. Let me know if you need any med refills.  Jitendra Baldwin

## 2018-03-21 ENCOUNTER — OFFICE VISIT (OUTPATIENT)
Dept: CARDIOLOGY CLINIC | Age: 48
End: 2018-03-21

## 2018-03-21 VITALS
HEIGHT: 69 IN | RESPIRATION RATE: 20 BRPM | BODY MASS INDEX: 31.7 KG/M2 | WEIGHT: 214 LBS | OXYGEN SATURATION: 99 % | SYSTOLIC BLOOD PRESSURE: 144 MMHG | HEART RATE: 70 BPM | DIASTOLIC BLOOD PRESSURE: 76 MMHG

## 2018-03-21 DIAGNOSIS — E03.9 HYPOTHYROIDISM, UNSPECIFIED TYPE: ICD-10-CM

## 2018-03-21 DIAGNOSIS — I48.19 PERSISTENT ATRIAL FIBRILLATION (HCC): Primary | ICD-10-CM

## 2018-03-21 DIAGNOSIS — F41.9 ANXIETY: Chronic | ICD-10-CM

## 2018-03-21 NOTE — MR AVS SNAPSHOT
[address] [facility] 600 1007 [facility] 
[phone] Patient: Micheal Keyes MRN: [id number] Jarvis Rivera Visit Information Date & Time Provider Department Dept. Phone Encounter #  
 3/21/2018  3:40 PM Meme Hernandez MD [facility] Mariusz Chamberlain [phone] [id number] Your Appointments 3/21/2018  3:40 PM  
New Patient with Meme Hernandez MD  
[address] ([place] [place]) Appt Note: NP irregular HR ref NP bobby hayes 700 [facility] 600 1007 [facility]  
[phone]  
  
   
 [address] [address]  
  
    
 4/9/2018 10:40 AM  
MAMMOGRAPHY with MAMMOGRAM, MERVIN Olivarez ([facility]) Appt Note: mammo + ae  TH/bcbs; AE/MAMMO TH pt  
 [address] [facility] [address] [place] [address]  
[phone]  
  
   
 [address] [place] [address]  
  
    
 4/9/2018 11:00 AM  
ESTABLISHED PATIENT with MD José Luis Haddad ([facility]) Appt Note: AE/MAMMO TH pt  
 566 [facility] [address] [facility] [address]  
[facility] [address] Upcoming Health Maintenance Date Due DTaP/Tdap/Td series (1 - Tdap) 8/25/1990 BREAST CANCER SCRN MAMMOGRAM 4/24/2016 Influenza Age 5 to Adult 8/1/2017 PAP AKA CERVICAL CYTOLOGY 4/23/2019 Allergies as of 3/21/2018  Review Complete On: 3/21/2018 By: Nicky Gross LPN Severity Noted Reaction Type Reactions Doxycycline  03/09/2017    Hives, Rash Pcn [Penicillins]  02/25/2010   Systemic Hives Current Immunizations  Never Reviewed No immunizations on file. Not reviewed this visit You Were Diagnosed With   
  
 Codes Comments Anxiety    -  Primary ICD-10-CM: F41.9 ICD-9-CM: 300.00 Vitals BP Pulse Resp Height(growth percentile) Weight(growth percentile) LMP  
 144/76 (BP 1 Location: Left arm, BP Patient Position: Sitting) 70 20 5' 9\" (1.753 m) 214 lb (97.1 kg) 05/26/2015 SpO2 BMI OB Status Smoking Status 99% 31.6 kg/m2 Hysterectomy Former Smoker Vitals History BMI and BSA Data Body Mass Index Body Surface Area  
 31.6 kg/m 2 2.17 m 2 Preferred Pharmacy Pharmacy Name Phone [id number] [place], [address] [facility] [phone] Your Updated Medication List  
  
   
This list is accurate as of 3/21/18  3:31 PM.  Always use your most recent med list.  
  
  
  
  
 aspirin 81 mg chewable tablet Take 1 Tab by mouth daily. baclofen 10 mg tablet Commonly known as:  LIORESAL Take 1 Tab by mouth three (3) times daily. As needed for spasm  
  
 diclofenac EC 75 mg EC tablet Commonly known as:  VOLTAREN Take 1 Tab by mouth two (2) times a day. DIVIGEL 1 mg/gram (0.1 %) Glpk Generic drug:  Estradiol 1 Packet by TransDERmal route daily. levothyroxine 112 mcg tablet Commonly known as:  SYNTHROID Take 1 Tab by mouth Daily (before breakfast). metoprolol succinate 25 mg XL tablet Commonly known as:  TOPROL-XL Take 1 Tab by mouth daily. Introducing [facility] & HEALTH SERVICES! Dear Lita Mariee: 
Thank you for requesting a [employer] account. Our records indicate that you already have an active [employer] account. You can access your account anytime at [email or web address] Did you know that you can access your hospital and ER discharge instructions at any time in [employer]? You can also review all of your test results from your hospital stay or ER visit. Additional Information If you have questions, please visit the Frequently Asked Questions section of the [employer] website at [email or web address] Remember, [email or web address] is NOT to be used for urgent needs. For medical emergencies, dial 911. Now available from your iPhone and Android! Please provide this summary of care documentation to your next provider. Your primary care clinician is listed as Mateo Severin. If you have any questions after today's visit, please call [phone].

## 2018-03-21 NOTE — PROGRESS NOTES
Pool Whatley MD    [address] [place], [address] [facility]    Office [phone],EIQ [phone]  Pager [phone] is a 49 y.o. female referred for evaluation of Aifb. Consult requested by Rayshawn Hutchison NP      Primary care physician:  Rayshawn Hutchison NP      Chief complaint:  Latrell Rodriguez is a 49 y.o. female who complains of   Chief Complaint   Patient presents with    Irregular Heart Beat     NP ref by pcp     Dear Ms Madeline Ruiz,    I had the pleasure of seeing Ms Latrell Rodriguez in the office today. Assessment:  Atrial fibrillation-diagnosed on 3/12/80-incidental finding during routine office visit. Chest tightness  Dizziness  History of hypothyroidism-normal TSH 3/12/18  Anxiety        Plan:   Continue metoprolol XL 25 mg daily. I had an extensive discussion about the pathophysiology of atrial fibrillation and the pros/cons of anticoagulation. Will start Eliquis 5 mg twice daily. Stop aspirin. Also asked her not to take any NSAIDs. Stress nuclear study-continue metoprolol (switch to Shanell nuclear if needed)  Echocardiogram  Follow-up in 4 weeks. If she she continues to be in atrial fibrillation, will consider DC CV  Will need sleep study. Aggressive cardiovascular risk factor modification. Patient understands the plan. All questions were answered to the patient's satisfaction. Medication Side Effects and Warnings were discussed with patient: yes  Patient Labs were reviewed and or requested:  yes  Patient Past Records were reviewed and or requested: yes    I appreciate the opportunity to be involved in Ms. Vincent. Please see note below for details. Please do not hesitate to contact us with questions or concerns. Pool Whatley MD    Cardiac Testing/ Procedures: A. Cardiac Cath/PCI:    B.ECHO/MICHELLE:    C.StressNuclear/Stress ECHO/Stress test:    D.Vascular:    E. EP:    F. Miscellaneous:    History of present illness:    Patient is a pleasant 54-year-old  lady who was diagnosed with atrial fibrillation during a routine follow-up visit with her PCP. No prior history of arrhythmia/CAD. Occasionally has mild left-sided chest tightness is resolved spontaneously. Occasionally has noticed irregular heart rate associated with mild dizziness. No syncope. She was started on metoprolol XL on 3/12/18. TSH was normal.  No history of prior GI bleed/hematuria/intracranial bleed. Past Medical History:   Diagnosis Date    Anxiety 2/25/2010    Hypothyroid 8/26/2010    Ill-defined condition     depression    IUD (intrauterine device) in place 11/29/13    placed for menorrhagia    Migraine 2/25/2010    Psychotic disorder       Past Surgical History:   Procedure Laterality Date    ABDOMEN SURGERY PROC UNLISTED      laparotomy with appendectomy    HX HYSTERECTOMY  05/27/2015    Davinci LTH/BSO    HX LEFT SALPINGECTOMY      ectopic    HX ORTHOPAEDIC Right     heel and wrist    HX TUBAL LIGATION       Family History   Problem Relation Age of Onset    Hypertension Mother     Migraines Mother     Kidney Disease Father       Social History   Substance Use Topics    Smoking status: Former Smoker     Packs/day: 0.50     Years: 33.00     Quit date: 3/28/2017    Smokeless tobacco: Never Used    Alcohol use Yes      Comment: socially          Medications before admission:    Current Outpatient Prescriptions   Medication Sig Dispense    metoprolol succinate (TOPROL-XL) 25 mg XL tablet Take 1 Tab by mouth daily. 30 Tab    aspirin 81 mg chewable tablet Take 1 Tab by mouth daily.  DIVIGEL 1 mg/gram (0.1 %) glpk 1 Packet by TransDERmal route daily. 30 Packet    levothyroxine (SYNTHROID) 112 mcg tablet Take 1 Tab by mouth Daily (before breakfast). 30 Tab    baclofen (LIORESAL) 10 mg tablet Take 1 Tab by mouth three (3) times daily.  As needed for spasm 60 Tab    diclofenac EC (VOLTAREN) 75 mg EC tablet Take 1 Tab by mouth two (2) times a day. 60 Tab    levocetirizine (XYZAL) 5 mg tablet take 1 tablet by mouth once daily 90 Tab     No current facility-administered medications for this visit. Review of Systems:  (bold if positive, if negative)    Gen:  Eyes:  ENT:  CVS: Pulm:  GI:    :    MS:  Skin:  Psych:  anxietyHem:  Renal:    Neuro: headache,        Physical Exam:  Visit Vitals    Ht 5' 9\" (1.753 m)    LMP 05/26/2015          Gen: Well-developed, well-nourished, in no acute distress  HEENT:  Pink conjunctivae, hearing intact to voice, moist mucous membranes  Neck: Supple,No JVD, No Carotid Bruit, Thyroid- non tender  Resp: No accessory muscle use, Clear breath sounds, No rales or rhonchi  Card: Irregular Rate,Rythm,Normal S1, S2, No murmurs, rubs or gallop. No thrills. Abd:  Soft, non-tender, non-distended, normoactive bowel sounds are present,   MSK: No cyanosis   Skin: Rash R Hand +  Neuro:  , moving all four extremities, no focal deficit, follows commands appropriately  Psych:  Good insight, oriented to person, place and time, alert, Nml Affect  LE: No edema  Vascular: Radial pulses 2+ and symmetric        EKG: 3/12/18-A. fib/VT WP      Cxray:    LABS:    No results for input(s): CPK, TROIQ in the last 72 hours.     No lab exists for component: CKQMB, CPKMB    Lab Results   Component Value Date/Time    WBC 5.9 03/12/2018 09:50 AM    HGB 13.5 03/12/2018 09:50 AM    HCT 40.4 03/12/2018 09:50 AM    PLATELET 420 91/58/3641 09:50 AM    MCV 88 03/12/2018 09:50 AM     Lab Results   Component Value Date/Time    Sodium 142 03/12/2018 09:50 AM    Potassium 4.0 03/12/2018 09:50 AM    Chloride 102 03/12/2018 09:50 AM    CO2 24 03/12/2018 09:50 AM    Anion gap 10 03/17/2010 10:55 AM    Glucose 109 (H) 03/12/2018 09:50 AM    BUN 20 03/12/2018 09:50 AM    Creatinine 1.19 (H) 03/12/2018 09:50 AM    BUN/Creatinine ratio 17 03/12/2018 09:50 AM    GFR est AA 62 03/12/2018 09:50 AM    GFR est non-AA 54 (L) 03/12/2018 09:50 AM    Calcium 9.0 03/12/2018 09:50 AM             Jeremy Santos MD

## 2018-03-26 RX ORDER — LEVOTHYROXINE SODIUM 112 UG/1
TABLET ORAL
Qty: 30 TAB | Refills: 1 | Status: SHIPPED | OUTPATIENT
Start: 2018-03-26 | End: 2018-04-10 | Stop reason: SDUPTHER

## 2018-04-10 RX ORDER — LEVOTHYROXINE SODIUM 112 UG/1
TABLET ORAL
Qty: 90 TAB | Refills: 0 | Status: SHIPPED | OUTPATIENT
Start: 2018-04-10 | End: 2018-07-06 | Stop reason: SDUPTHER

## 2018-04-11 ENCOUNTER — CLINICAL SUPPORT (OUTPATIENT)
Dept: CARDIOLOGY CLINIC | Age: 48
End: 2018-04-11

## 2018-04-11 DIAGNOSIS — R06.09 DOE (DYSPNEA ON EXERTION): ICD-10-CM

## 2018-04-11 DIAGNOSIS — I48.19 PERSISTENT ATRIAL FIBRILLATION (HCC): ICD-10-CM

## 2018-04-11 DIAGNOSIS — R07.9 CHEST PAIN, UNSPECIFIED TYPE: Primary | ICD-10-CM

## 2018-04-11 DIAGNOSIS — I48.19 PERSISTENT ATRIAL FIBRILLATION (HCC): Primary | ICD-10-CM

## 2018-04-11 DIAGNOSIS — I49.3 PVC'S (PREMATURE VENTRICULAR CONTRACTIONS): ICD-10-CM

## 2018-04-11 NOTE — PROGRESS NOTES
See scanned report. Dr. Shyann Milligan ordered study and Dr. Shyann Milligan read study. ID verified per protocol. Explained procedure to patient. Obtaining IV access, radiation exposure, risks and discomforts (for exercise- change to lexiwalk stress). , waiting between injection(s) and obtaining images. All concerns and questions addressed prior to obtaining consent test. Patient developed dyspnea during test. At 3 minutes in recovey, patient without symptoms or complaints voiced.

## 2018-05-09 ENCOUNTER — OFFICE VISIT (OUTPATIENT)
Dept: OBGYN CLINIC | Age: 48
End: 2018-05-09

## 2018-05-09 VITALS
SYSTOLIC BLOOD PRESSURE: 124 MMHG | BODY MASS INDEX: 32.44 KG/M2 | HEIGHT: 69 IN | DIASTOLIC BLOOD PRESSURE: 84 MMHG | WEIGHT: 219 LBS

## 2018-05-09 DIAGNOSIS — Z79.890 HORMONE REPLACEMENT THERAPY: ICD-10-CM

## 2018-05-09 DIAGNOSIS — Z01.419 ENCOUNTER FOR GYNECOLOGICAL EXAMINATION WITHOUT ABNORMAL FINDING: Primary | ICD-10-CM

## 2018-05-09 RX ORDER — ESTRADIOL 1 MG/G
1 GEL TOPICAL DAILY
Qty: 30 PACKET | Refills: 1 | Status: CANCELLED | OUTPATIENT
Start: 2018-05-09

## 2018-05-09 NOTE — PROGRESS NOTES
Lenore Velasquez is a ,  49 y.o. female [facility] whose Patient's last menstrual period was 2015. who presents for her annual checkup. She is having no significant problems. Recently dx of atrial fibrillation in 2018    Hormone Status:    She is not having vasomotor symptoms. The patient is using HRT: divigel    Sexual history:    She  reports that she currently engages in sexual activity and has had male partners. She reports using the following method of birth control/protection: Surgical.    Medical conditions:    Since her last annual GYN exam 1 year ago on 2017, she has had the following changes in her health history: atrial fibrillation. 2018. Pap and Mammogram History:    Her most recent Pap smear was normal/-HPV obtained 2014      The patient had her mammogram today in our office. Breast Cancer History/Substance Abuse:    She has no family history of breast cancer. Osteoporosis History:    Family history does not include a first or second degree relative with osteopenia or osteoporosis. A bone density scan was not obtained. She is currently not taking calcium and vit D. Past Medical History:   Diagnosis Date    Anxiety 2010    Atrial fibrillation ([address] 2018    Hypothyroid 2010    Ill-defined condition     depression    Migraine 2010    Psychotic disorder      Past Surgical History:   Procedure Laterality Date    ABDOMEN SURGERY PROC UNLISTED      laparotomy with appendectomy    HX HYSTERECTOMY  2015    Davinci LTH/BSO    HX LEFT SALPINGECTOMY      ectopic    HX ORTHOPAEDIC Right     heel and wrist    HX TUBAL LIGATION       Tobacco History:  reports that she quit smoking about 13 months ago. She has a 16.50 pack-year smoking history. She has never used smokeless tobacco.  Alcohol Abuse:  reports that she drinks alcohol. Drug Abuse:  reports that she does not use illicit drugs.   Current Outpatient Prescriptions   Medication Sig Dispense Refill    levothyroxine (SYNTHROID) 112 mcg tablet TAKE 1 TABLET BY MOUTH EVERY DAY BEFORE BREAKFAST 90 Tab 0    apixaban (ELIQUIS) 5 mg tablet Take 1 Tab by mouth two (2) times a day. 60 Tab 2    metoprolol succinate (TOPROL-XL) 25 mg XL tablet Take 1 Tab by mouth daily. 30 Tab 2    DIVIGEL 1 mg/gram (0.1 %) glpk 1 Packet by TransDERmal route daily. 30 Packet 1    aspirin 81 mg chewable tablet Take 1 Tab by mouth daily.  baclofen (LIORESAL) 10 mg tablet Take 1 Tab by mouth three (3) times daily. As needed for spasm 60 Tab 1     Allergies: Doxycycline and Pcn [penicillins]   Social History     Social History    Marital status:      Spouse name: N/A    Number of children: N/A    Years of education: N/A     Occupational History    Not on file.      Social History Main Topics    Smoking status: Former Smoker     Packs/day: 0.50     Years: 33.00     Quit date: 3/28/2017    Smokeless tobacco: Never Used    Alcohol use Yes      Comment: socially    Drug use: No    Sexual activity: Yes     Partners: Male     Birth control/ protection: Surgical     Other Topics Concern    Not on file     Social History Narrative     Patient Active Problem List   Diagnosis Code    Anxiety F41.9    Migraine G43.909    Hypothyroid E03.9    Menorrhagia N92.0    Endometriosis N80.9    Atrial fibrillation ([facility] I48.91       Review of Systems - History obtained from the patient  Constitutional: negative for weight loss, fever, night sweats  HEENT: negative for hearing loss, earache, congestion, snoring, sorethroat  CV: negative for chest pain, palpitations, edema  Resp: negative for cough, shortness of breath, wheezing  GI: negative for change in bowel habits, abdominal pain, black or bloody stools  : negative for frequency, dysuria, hematuria, vaginal discharge  MSK: negative for back pain, joint pain, muscle pain  Breast: negative for breast lumps, nipple discharge, galactorrhea  Skin :negative for itching, rash, hives  Neuro: negative for dizziness, headache, confusion, weakness  Psych: negative for anxiety, depression, change in mood  Heme/lymph: negative for bleeding, bruising, pallor    Physical Exam    Visit Vitals    /84 (BP 1 Location: Left arm, BP Patient Position: Sitting)    Ht 5' 9\" (1.753 m)    Wt 219 lb (99.3 kg)    LMP 05/26/2015    BMI 32.34 kg/m2     Constitutional  · Appearance: well-nourished, well developed, alert, in no acute distress    HENT  · Head and Face: appears normal    Neck  · Inspection/Palpation: normal appearance, no masses or tenderness  · Lymph Nodes: no lymphadenopathy present  · Thyroid: gland size normal, nontender, no nodules or masses present on palpation    Chest  · Respiratory Effort: breathing normal        Auscultation: normal breath sounds    Cardiovascular  · Heart:  · Auscultation: regular rate and rhythm without murmur    Breasts  · Inspection of Breasts: breasts symmetrical, no skin changes, no discharge present, nipple appearance normal, no skin retraction present  · Palpation of Breasts and Axillae: no masses present on palpation, no breast tenderness  · Axillary Lymph Nodes: no lymphadenopathy present    Gastrointestinal  · Abdominal Examination: abdomen non-tender to palpation, normal bowel sounds, no masses present  · Liver and spleen: no hepatomegaly present, spleen not palpable  · Hernias: no hernias identified    Genitourinary  · External Genitalia: normal appearance for age, no discharge present, no tenderness present, no inflammatory lesions present, no masses present, no atrophy present  · Vagina: normal vaginal vault without central or paravaginal defects, no discharge present, no inflammatory lesions present, no masses present  · Bladder: non-tender to palpation  · Urethra: appears normal  · Cervix: absent  · Uterus: absent  · Adnexa: no adnexal tenderness present, no adnexal masses present  · Perineum: perineum within normal limits, no evidence of trauma, no rashes or skin lesions present  · Anus: anus within normal limits, no hemorrhoids present  · Inguinal Lymph Nodes: no lymphadenopathy present      Skin  · General Inspection: no rash, no lesions identified    Neurologic/Psychiatric  · Mental Status:  · Orientation: grossly oriented to person, place and time  · Mood and Affect: mood normal, affect appropriate    . Assessment:  Routine gynecologic examination  Her current medical status is satisfactory with no evidence of significant gynecologic issues.     Plan:  Counseled re: diet, exercise, healthy lifestyle  Return for yearly wellness visits  Rec annual mammogram  Refill divigel  States docs are aware she on Eliquis and Estrogen

## 2018-05-09 NOTE — PATIENT INSTRUCTIONS
Breast Self-Exam: Care Instructions  Your Care Instructions    A breast self-exam is when you check your breasts for lumps or changes. This regular exam helps you learn how your breasts normally look and feel. Most breast problems or changes are not because of cancer. Breast self-exam is not a substitute for a mammogram. Having regular breast exams by your doctor and regular mammograms improve your chances of finding any problems with your breasts. Some women set a time each month to do a step-by-step breast self-exam. Other women like a less formal system. They might look at their breasts as they brush their teeth, or feel their breasts once in a while in the shower. If you notice a change in your breast, tell your doctor. Follow-up care is a key part of your treatment and safety. Be sure to make and go to all appointments, and call your doctor if you are having problems. It's also a good idea to know your test results and keep a list of the medicines you take. How do you do a breast self-exam?  · The best time to examine your breasts is usually one week after your menstrual period begins. Your breasts should not be tender then. If you do not have periods, you might do your exam on a day of the month that is easy to remember. · To examine your breasts:  ¨ Remove all your clothes above the waist and lie down. When you are lying down, your breast tissue spreads evenly over your chest wall, which makes it easier to feel all your breast tissue. ¨ Use the pads-not the fingertips-of the 3 middle fingers of your left hand to check your right breast. Move your fingers slowly in small coin-sized circles that overlap. ¨ Use three levels of pressure to feel of all your breast tissue. Use light pressure to feel the tissue close to the skin surface. Use medium pressure to feel a little deeper. Use firm pressure to feel your tissue close to your breastbone and ribs.  Use each pressure level to feel your breast tissue before moving on to the next spot. ¨ Check your entire breast, moving up and down as if following a strip from the collarbone to the bra line, and from the armpit to the ribs. Repeat until you have covered the entire breast.  ¨ Repeat this procedure for your left breast, using the pads of the 3 middle fingers of your right hand. · To examine your breasts while in the shower:  ¨ Place one arm over your head and lightly soap your breast on that side. ¨ Using the pads of your fingers, gently move your hand over your breast (in the strip pattern described above), feeling carefully for any lumps or changes. ¨ Repeat for the other breast.  · Have your doctor inspect anything you notice to see if you need further testing. Where can you learn more? Go to http://ziyad-jl.info/. Enter P148 in the search box to learn more about \"Breast Self-Exam: Care Instructions. \"  Current as of: May 12, 2017  Content Version: 11.4  © 7311-3733 [employer]wise, [employer]orporated. Care instructions adapted under license by [employer] (which disclaims liability or warranty for this information). If you have questions about a medical condition or this instruction, always ask your healthcare professional. [place] [address] any warranty or liability for your use of this information.

## 2018-05-10 ENCOUNTER — TELEPHONE (OUTPATIENT)
Dept: OBGYN CLINIC | Age: 48
End: 2018-05-10

## 2018-05-10 DIAGNOSIS — Z79.890 HORMONE REPLACEMENT THERAPY: ICD-10-CM

## 2018-05-10 RX ORDER — ESTRADIOL 1 MG/G
1 GEL TOPICAL DAILY
Qty: 90 PACKET | Refills: 3 | Status: SHIPPED | OUTPATIENT
Start: 2018-05-10 | End: 2019-10-18 | Stop reason: SDUPTHER

## 2018-05-10 NOTE — TELEPHONE ENCOUNTER
Call received at 4:01PM      49year old patient last seen in the office yesterday . Patient calling to say the her refill on Divigel       . Assessment:  Routine gynecologic examination  Her current medical status is satisfactory with no evidence of significant gynecologic issues.     Plan:  Counseled re: diet, exercise, healthy lifestyle  Return for yearly wellness visits  Rec annual mammogram  Refill divigel  States docs are aware she on Eliquis and Estrogen        Prescription sent as per MD order to patient preferred pharmacy to get patient to her AE 5/19    Patient verbalized understanding.

## 2018-05-11 LAB
CYTOLOGIST CVX/VAG CYTO: NORMAL
CYTOLOGY CVX/VAG DOC THIN PREP: NORMAL
CYTOLOGY HISTORY:: NORMAL
DX ICD CODE: NORMAL
LABCORP, 190119: NORMAL
Lab: NORMAL
OTHER STN SPEC: NORMAL
PATH REPORT.FINAL DX SPEC: NORMAL
STAT OF ADQ CVX/VAG CYTO-IMP: NORMAL

## 2018-06-06 RX ORDER — METOPROLOL SUCCINATE 25 MG/1
TABLET, EXTENDED RELEASE ORAL
Qty: 30 TAB | Refills: 2 | Status: SHIPPED | OUTPATIENT
Start: 2018-06-06 | End: 2018-06-08 | Stop reason: SDUPTHER

## 2018-06-08 ENCOUNTER — OFFICE VISIT (OUTPATIENT)
Dept: CARDIOLOGY CLINIC | Age: 48
End: 2018-06-08

## 2018-06-08 VITALS
HEART RATE: 64 BPM | WEIGHT: 220.2 LBS | BODY MASS INDEX: 32.61 KG/M2 | DIASTOLIC BLOOD PRESSURE: 80 MMHG | HEIGHT: 69 IN | OXYGEN SATURATION: 98 % | SYSTOLIC BLOOD PRESSURE: 110 MMHG

## 2018-06-08 DIAGNOSIS — I48.19 PERSISTENT ATRIAL FIBRILLATION (HCC): Primary | ICD-10-CM

## 2018-06-08 DIAGNOSIS — N80.9 ENDOMETRIOSIS: ICD-10-CM

## 2018-06-08 DIAGNOSIS — F41.9 ANXIETY: Chronic | ICD-10-CM

## 2018-06-08 DIAGNOSIS — E03.9 HYPOTHYROIDISM, UNSPECIFIED TYPE: ICD-10-CM

## 2018-06-08 DIAGNOSIS — Z79.01 ANTICOAGULANT LONG-TERM USE: ICD-10-CM

## 2018-06-08 DIAGNOSIS — Z90.710 HX OF HYSTERECTOMY: ICD-10-CM

## 2018-06-08 RX ORDER — METOPROLOL SUCCINATE 25 MG/1
TABLET, EXTENDED RELEASE ORAL
Qty: 90 TAB | Refills: 3 | Status: SHIPPED | OUTPATIENT
Start: 2018-06-08 | End: 2019-08-26 | Stop reason: SDUPTHER

## 2018-06-08 NOTE — PROGRESS NOTES
Patient says she feels like she is having a panic attack   Patient says she has dizzy spells off and on    Visit Vitals    /80 (BP 1 Location: Right arm, BP Patient Position: Sitting)    Pulse 64    Ht 5' 9\" (1.753 m)    Wt 220 lb 3.2 oz (99.9 kg)    SpO2 98%    BMI 32.52 kg/m2

## 2018-06-08 NOTE — PROGRESS NOTES
[address], [address] [facility]    Office [phone]  Fax [phone]       John Starks is a 49 y.o. female last seen by Dr. Sharon Ashraf 3 months ago. Assessment  Encounter Diagnoses   Name Primary?  Persistent atrial fibrillation (HCC) Yes    Hypothyroidism, unspecified type     Anxiety     Anticoagulant long-term use     Hx of hysterectomy     Endometriosis        Recommendations:  1. PAF - Feels that the frequency of her \"spells\" have increased. SR by exam today. Echo 4/11/8 showed normal pumping function, no valvular disease and nml atrial dimension. Normal stress MPI on same day. - Continue Toprol 25 mg daily. HR 64 in office today. Normotensive.   - Continue Eliquis 5 mg BID. Repeat CBC today, ~ 3 months since starting NOAC to ensure stable Hgb. Will phone follow up to review results. - Plan to further evaluate rate control with 48 hr Holter monitor  - Re-submit sleep study referral  - Encouraged her to continue to avoid caffeine. Discussed stress reduction techniques. The patient was encouraged to continue current medications, remain active and call with any new complaints or concerns. Will determine follow up plan based on 48 Hr Holter results. Subjective:  Mrs. Brisa Shukla continues to experience episodes of tachycardia and palpitations. She reports that her \"anxiety attack feelings have increased\" - this is how she describes her AF spells. They last for several minutes. Resolve spontaneously. Are associated with a feeling of shortness of breath. She denies any lightheadedness, dizziness or near syncope. She avoids caffeine. Notes high levels of stress with work and home. She reports compliance with Toprol and Eliquis. No bleeding or bruising concerns. She was never contacted for scheduling sleep study.       Cardiac risk factors   HTN no  DM  no  Smoking no    Cardiac testing  Exercise Cardiolite 4/11/18 - 5:00. Normal perfusion. EF 76%. Echo 4/11/18 - EF 60%. No WMA. Normal atrial dimensions. Past Medical History:   Diagnosis Date    Anxiety 2/25/2010    Atrial fibrillation ([facility] 04/2018    Hypothyroid 8/26/2010    Ill-defined condition     depression    Migraine 2/25/2010    Psychotic disorder         Current Outpatient Prescriptions   Medication Sig Dispense Refill    metoprolol succinate (TOPROL-XL) 25 mg XL tablet TAKE 1 TAB BY MOUTH DAILY. 30 Tab 2    DIVIGEL 1 mg/gram (0.1 %) glpk 1 Packet by TransDERmal route daily. 90 Packet 3    levothyroxine (SYNTHROID) 112 mcg tablet TAKE 1 TABLET BY MOUTH EVERY DAY BEFORE BREAKFAST 90 Tab 0    apixaban (ELIQUIS) 5 mg tablet Take 1 Tab by mouth two (2) times a day. 60 Tab 2       Allergies   Allergen Reactions    Doxycycline Hives and Rash    Pcn [Penicillins] Hives      Review of Systems  Constitutional: Negative for fever, chills, malaise/fatigue and diaphoresis. Respiratory: Negative for cough, hemoptysis, sputum production, and wheezing.  +shortness of breath (assoc.with palp/\"panic attacks\")  Cardiovascular: Negative for chest pain, orthopnea, claudication, leg swelling and PND. +palpitations  Gastrointestinal: Negative for heartburn, nausea, vomiting, blood in stool and melena. Genitourinary: Negative for dysuria and flank pain. Musculoskeletal: Negative for joint pain and back pain. Skin: Negative for rash. Neurological: Negative for focal weakness, seizures, loss of consciousness, weakness and headaches. Endo/Heme/Allergies: Does not bruise/bleed easily. Psychiatric/Behavioral: Negative for memory loss. The patient does not have insomnia.       Physical Exam  Visit Vitals    /80 (BP 1 Location: Right arm, BP Patient Position: Sitting)    Pulse 64    Ht 5' 9\" (1.753 m)    Wt 220 lb 3.2 oz (99.9 kg)    LMP 05/26/2015    SpO2 98%    BMI 32.52 kg/m2     Wt Readings from Last 3 Encounters:   06/08/18 220 lb 3.2 oz (99.9 kg) 05/09/18 219 lb (99.3 kg)   03/21/18 214 lb (97.1 kg)      General - well developed well nourished  Neck - JVP normal, thyroid nl  Cardiac - normal S1,S2, no murmurs, rubs or gallops.  No clicks +extra systoles  Vascular - carotids without bruits, radials, femorals and pedal pulses equal bilateral  Lungs - clear to auscultation bilaterals, no rales ,wheezing or rhonchi  Abd - soft nontender, no HSM, no abd bruits  Extremities - no edema  Skin - no rash  Neuro - nonfocal  Psych - normal mood and affect    Lab Results   Component Value Date/Time    Sodium 142 03/12/2018 09:50 AM    Potassium 4.0 03/12/2018 09:50 AM    Chloride 102 03/12/2018 09:50 AM    CO2 24 03/12/2018 09:50 AM    Anion gap 10 03/17/2010 10:55 AM    Glucose 109 (H) 03/12/2018 09:50 AM    BUN 20 03/12/2018 09:50 AM    Creatinine 1.19 (H) 03/12/2018 09:50 AM    BUN/Creatinine ratio 17 03/12/2018 09:50 AM    GFR est AA 62 03/12/2018 09:50 AM    GFR est non-AA 54 (L) 03/12/2018 09:50 AM    Calcium 9.0 03/12/2018 09:50 AM     Lab Results   Component Value Date/Time    TSH 1.830 03/12/2018 09:50 AM       Du East NP

## 2018-06-08 NOTE — PATIENT INSTRUCTIONS
I would like to further evaluate what you are feeling when you have these \"panic attacks\". I am going to have you were monitor for for 2 days to see if we can catch what's going on. Continue to take your Metoprolol and Eliquis as prescribed. I am going to have you go to the lab today to recheck your blood counts, now on the blood thinner. Following this check, this will then need to be done every 6 months to a year to assure stability. I am going to re-submit the sleep study referral. This should be done to ensure this is not a hidden cause for your irregular heart rhythm.

## 2018-06-08 NOTE — MR AVS SNAPSHOT
[address] [facility] [address] [facility] 
[phone] Patient: Arianna Adams MRN: [id number] Austen Resendiz Visit Information Date & Time Provider Department Dept. Phone Encounter #  
 6/8/2018  9:30 AM Mahendra Whipple NP [facility] Arizmendi [phone] [id number] Upcoming Health Maintenance Date Due DTaP/Tdap/Td series (1 - Tdap) 8/25/1990 Influenza Age 5 to Adult 8/1/2018 BREAST CANCER SCRN MAMMOGRAM 5/9/2019 PAP AKA CERVICAL CYTOLOGY 5/9/2023 Allergies as of 6/8/2018  Review Complete On: 6/8/2018 By: Mahendra Whipple NP Severity Noted Reaction Type Reactions Doxycycline  03/09/2017    Hives, Rash Pcn [Penicillins]  02/25/2010   Systemic Hives Current Immunizations  Never Reviewed No immunizations on file. Not reviewed this visit You Were Diagnosed With   
  
 Codes Comments Persistent atrial fibrillation (HCC)    -  Primary ICD-10-CM: I48.1 ICD-9-CM: 427.31 Hypothyroidism, unspecified type     ICD-10-CM: E03.9 ICD-9-CM: 244.9 Anxiety     ICD-10-CM: F41.9 ICD-9-CM: 300.00 Anticoagulant long-term use     ICD-10-CM: Z79.01 
ICD-9-CM: V58.61 Vitals BP Pulse Height(growth percentile) Weight(growth percentile) LMP SpO2  
 110/80 (BP 1 Location: Right arm, BP Patient Position: Sitting) 64 5' 9\" (1.753 m) 220 lb 3.2 oz (99.9 kg) 05/26/2015 98% BMI OB Status Smoking Status 32.52 kg/m2 Hysterectomy Former Smoker Vitals History BMI and BSA Data Body Mass Index Body Surface Area 32.52 kg/m 2 2.21 m 2 Preferred Pharmacy Pharmacy Name Phone [id number] [phone] Your Updated Medication List  
  
   
This list is accurate as of 6/8/18 10:02 AM.  Always use your most recent med list.  
  
  
  
  
 apixaban 5 mg tablet Commonly known as:  [place] Take 1 Tab by mouth two (2) times a day. DIVIGEL 1 mg/gram (0.1 %) Glpk Generic drug:  Estradiol 1 Packet by TransDERmal route daily. levothyroxine 112 mcg tablet Commonly known as:  SYNTHROID  
TAKE 1 TABLET BY MOUTH EVERY DAY BEFORE BREAKFAST  
  
 metoprolol succinate 25 mg XL tablet Commonly known as:  TOPROL-XL  
TAKE 1 TAB BY MOUTH DAILY. Prescriptions Sent to Pharmacy Refills  
 metoprolol succinate (TOPROL-XL) 25 mg XL tablet 3 Sig: TAKE 1 TAB BY MOUTH DAILY. Class: Normal  
 Pharmacy: [id number] [place] [address], [address] [place] [address] #: [phone] We Performed the Following CBC W/O DIFF [16778 CPT(R)] SLEEP MEDICINE REFERRAL [ZTM600 Custom] Comments:  
 Please evaluate and treat patient for MARIA FERNANDA. To-Do List   
 Around 06/18/2018 ECG:  ECG HOLTER MONITOR, UP TO 48 HRS Referral Information Referral ID Referred By Referred To  
  
 [id number] MD Dhiraj Mcgee  Dolly Manuel  Phone: [phone] Fax: [phone] Visits Status Start Date End Date 1 New Request 6/8/18 6/8/19 If your referral has a status of pending review or denied, additional information will be sent to support the outcome of this decision. Patient Instructions I would like to further evaluate what you are feeling when you have these \"panic attacks\". I am going to have you were monitor for for 2 days to see if we can catch what's going on. Continue to take your Metoprolol and Eliquis as prescribed. I am going to have you go to the lab today to recheck your blood counts, now on the blood thinner. Following this check, this will then need to be done every 6 months to a year to assure stability.    
 
I am going to re-submit the sleep study referral. This should be done to ensure this is not a hidden cause for your irregular heart rhythm. Introducing [facility]! Zhou Leo introduces [employer] patient portal. Now you can access parts of your medical record, email your doctor's office, and request medication refills online. 1. In your internet browser, go to [email or web address] 2. Click on the First Time User? Click Here link in the Sign In box. You will see the New Member Sign Up page. 3. Enter your [employer] Access Code exactly as it appears below. You will not need to use this code after youve completed the sign-up process. If you do not sign up before the expiration date, you must request a new code. · [employer] Access Code: [email or web address] Expires: 7/10/2018 12:51 PM 
 
4. Enter the last four digits of your Social Security Number (xxxx) and Date of Birth (mm/dd/yyyy) as indicated and click Submit. You will be taken to the next sign-up page. 5. Create a [employer] ID. This will be your [employer] login ID and cannot be changed, so think of one that is secure and easy to remember. 6. Create a [employer] password. You can change your password at any time. 7. Enter your Password Reset Question and Answer. This can be used at a later time if you forget your password. 8. Enter your e-mail address. You will receive e-mail notification when new information is available in [address] 9. Click Sign Up. You can now view and download portions of your medical record. 10. Click the Download Summary menu link to download a portable copy of your medical information. If you have questions, please visit the Frequently Asked Questions section of the [employer] website. Remember, [employer] is NOT to be used for urgent needs. For medical emergencies, dial 911. Now available from your iPhone and Android! Please provide this summary of care documentation to your next provider. Your primary care clinician is listed as Craa Mora. If you have any questions after today's visit, please call [phone].

## 2018-06-09 LAB
ERYTHROCYTE [DISTWIDTH] IN BLOOD BY AUTOMATED COUNT: 13.2 % (ref 12.3–15.4)
HCT VFR BLD AUTO: 40.4 % (ref 34–46.6)
HGB BLD-MCNC: 13.3 G/DL (ref 11.1–15.9)
MCH RBC QN AUTO: 30.2 PG (ref 26.6–33)
MCHC RBC AUTO-ENTMCNC: 32.9 G/DL (ref 31.5–35.7)
MCV RBC AUTO: 92 FL (ref 79–97)
PLATELET # BLD AUTO: 241 X10E3/UL (ref 150–379)
RBC # BLD AUTO: 4.41 X10E6/UL (ref 3.77–5.28)
WBC # BLD AUTO: 5.9 X10E3/UL (ref 3.4–10.8)

## 2018-06-15 PROBLEM — Z90.710 HX OF HYSTERECTOMY: Status: ACTIVE | Noted: 2018-06-15

## 2018-06-18 ENCOUNTER — CLINICAL SUPPORT (OUTPATIENT)
Dept: CARDIOLOGY CLINIC | Age: 48
End: 2018-06-18

## 2018-06-18 DIAGNOSIS — I48.19 PERSISTENT ATRIAL FIBRILLATION (HCC): ICD-10-CM

## 2018-06-18 NOTE — PROGRESS NOTES
Applied 48 hr Preventice holter per Sailaja Zurita NP dx: AF.  Pt has #[id number] & is due back on Wed 6/20/18.

## 2018-06-29 RX ORDER — APIXABAN 5 MG/1
TABLET, FILM COATED ORAL
Qty: 60 TAB | Refills: 2 | Status: SHIPPED | OUTPATIENT
Start: 2018-06-29 | End: 2018-09-26 | Stop reason: SDUPTHER

## 2018-06-29 NOTE — TELEPHONE ENCOUNTER
Fouzia(Pharmacist) from [facility] called following up on refill request.  Durga Carter can be reached at #[phone].   Thanks

## 2018-07-06 RX ORDER — LEVOTHYROXINE SODIUM 112 UG/1
TABLET ORAL
Qty: 90 TAB | Refills: 0 | Status: SHIPPED | OUTPATIENT
Start: 2018-07-06 | End: 2018-10-04 | Stop reason: SDUPTHER

## 2018-07-12 ENCOUNTER — TELEPHONE (OUTPATIENT)
Dept: CARDIOLOGY CLINIC | Age: 48
End: 2018-07-12

## 2018-07-12 NOTE — TELEPHONE ENCOUNTER
----- Message from Reyna Armstrong RN sent at 7/9/2018 10:51 AM EDT -----      ----- Message -----     From: Alexandrea Espinoza NP     Sent: 7/2/2018   2:57 PM       To: Reyna Armstrong RN    24 Hr Holter monitor 6/18/18 - SR, rates 53 to 132. 31 PAC's, 1 episode of atrial tachycardia, frequent PVC's. Diary entry correlates with SR with PVC's - rates recorded 72 to 80 bpm.    Please notify Mrs. Rai that her Holter did not show any recurrent afib. Rather, she had a normal rhythm with frequent extra beats, which we discussed when she was in the office. Her HR and BP prohibit any further increase of her Metoprolol at this time. Has she received a call from the sleep medicine specialist?     If there is any room for improvement in stress reduction, caffeine reductions, improved hydration, these things may help.

## 2018-09-26 RX ORDER — APIXABAN 5 MG/1
TABLET, FILM COATED ORAL
Qty: 60 TAB | Refills: 2 | Status: SHIPPED | OUTPATIENT
Start: 2018-09-26 | End: 2019-08-05 | Stop reason: ALTCHOICE

## 2018-10-04 RX ORDER — LEVOTHYROXINE SODIUM 112 UG/1
TABLET ORAL
Qty: 90 TAB | Refills: 0 | Status: SHIPPED | OUTPATIENT
Start: 2018-10-04 | End: 2019-01-04 | Stop reason: SDUPTHER

## 2018-10-05 RX ORDER — LEVOTHYROXINE SODIUM 112 UG/1
TABLET ORAL
Qty: 90 TAB | Refills: 0 | Status: SHIPPED | OUTPATIENT
Start: 2018-10-05 | End: 2020-07-28 | Stop reason: SDUPTHER

## 2018-10-11 ENCOUNTER — OFFICE VISIT (OUTPATIENT)
Dept: CARDIOLOGY CLINIC | Age: 48
End: 2018-10-11

## 2018-10-11 VITALS
HEIGHT: 69 IN | WEIGHT: 214 LBS | BODY MASS INDEX: 31.7 KG/M2 | HEART RATE: 72 BPM | OXYGEN SATURATION: 98 % | DIASTOLIC BLOOD PRESSURE: 80 MMHG | SYSTOLIC BLOOD PRESSURE: 120 MMHG

## 2018-10-11 DIAGNOSIS — I48.0 PAF (PAROXYSMAL ATRIAL FIBRILLATION) (HCC): Primary | ICD-10-CM

## 2018-10-11 NOTE — MR AVS SNAPSHOT
[address] [facility] 600 1007 [facility] 
[phone] Patient: Jacob Nevarez MRN: [id number] Gay Cooper Visit Information Date & Time Provider Department Dept. Phone Encounter #  
 10/11/2018  3:20 PM Luiz Villarreal MD [facility] Marley [facility]on [phone] [id number] Your Appointments 10/15/2019  9:20 AM  
ESTABLISHED PATIENT with Luiz Villarreal MD  
[facility] ([address]) Appt Note: 1 year follow up per Dr. Katie Barnes [address] [facility] 600 1007 [facility]  
[address] [facility] [address] [address] Upcoming Health Maintenance Date Due DTaP/Tdap/Td series (1 - Tdap) 8/25/1990 Influenza Age 5 to Adult 8/1/2018 BREAST CANCER SCRN MAMMOGRAM 5/9/2019 PAP AKA CERVICAL CYTOLOGY 5/9/2023 Allergies as of 10/11/2018  Review Complete On: 10/11/2018 By: Joanna Webb  
  
 Severity Noted Reaction Type Reactions Doxycycline  03/09/2017    Hives, Rash Pcn [Penicillins]  02/25/2010   Systemic Hives Current Immunizations  Never Reviewed No immunizations on file. Not reviewed this visit Vitals BP Pulse Height(growth percentile) Weight(growth percentile) LMP SpO2  
 120/80 (BP 1 Location: Left arm, BP Patient Position: Sitting) 72 5' 9\" (1.753 m) 214 lb (97.1 kg) 05/26/2015 98% BMI OB Status Smoking Status 31.6 kg/m2 Hysterectomy Former Smoker Vitals History BMI and BSA Data Body Mass Index Body Surface Area  
 31.6 kg/m 2 2.17 m 2 Preferred Pharmacy Pharmacy Name Phone [id number], [address] [facility] [phone] Your Updated Medication List  
  
   
This list is accurate as of 10/11/18  3:44 PM.  Always use your most recent med list.  
  
  
  
  
 DIVIGEL 1 mg/gram (0.1 %) Glpk Generic drug:  Estradiol 1 Packet by TransDERmal route daily. ELIQUIS 5 mg tablet Generic drug:  apixaban TAKE 1 TABLET BY MOUTH TWICE A DAY * levothyroxine 112 mcg tablet Commonly known as:  SYNTHROID  
TAKE 1 TABLET BY MOUTH EVERY DAY BEFORE BREAKFAST * levothyroxine 112 mcg tablet Commonly known as:  SYNTHROID  
TAKE 1 TABLET BY MOUTH EVERY DAY BEFORE BREAKFAST  
  
 metoprolol succinate 25 mg XL tablet Commonly known as:  TOPROL-XL  
TAKE 1 TAB BY MOUTH DAILY. * Notice: This list has 2 medication(s) that are the same as other medications prescribed for you. Read the directions carefully, and ask your doctor or other care provider to review them with you. Introducing [facility]! [employer] introduces [employer] patient portal. Now you can access parts of your medical record, email your doctor's office, and request medication refills online. 1. In your internet browser, go to [email or web address] 2. Click on the First Time User? Click Here link in the Sign In box. You will see the New Member Sign Up page. 3. Enter your [employer] Access Code exactly as it appears below. You will not need to use this code after youve completed the sign-up process. If you do not sign up before the expiration date, you must request a new code. · [employer] Access Code: [email or web address] Expires: 1/9/2019  3:44 PM 
 
4. Enter the last four digits of your Social Security Number (xxxx) and Date of Birth (mm/dd/yyyy) as indicated and click Submit. You will be taken to the next sign-up page. 5. Create a sonest ID. This will be your [employer] login ID and cannot be changed, so think of one that is secure and easy to remember. 6. Create a [employer] password. You can change your password at any time. 7. Enter your Password Reset Question and Answer. This can be used at a later time if you forget your password. 8. Enter your e-mail address. You will receive e-mail notification when new information is available in [address] 9. Click Sign Up. You can now view and download portions of your medical record. 10. Click the Download Summary menu link to download a portable copy of your medical information. If you have questions, please visit the Frequently Asked Questions section of the [employer] website. Remember, [employer] is NOT to be used for urgent needs. For medical emergencies, dial 911. Now available from your iPhone and Android! Please provide this summary of care documentation to your next provider. Your primary care clinician is listed as Yasmin Ismael. If you have any questions after today's visit, please call [phone].

## 2018-10-11 NOTE — PROGRESS NOTES
Caity Hernandez MD    [id number] [place], [address] [facility]    Office [phone],Y [phone]  Pager [phone] is a 51 y.o. female is here for f/u visit for    Primary care physician:  Jennifer Mendoza NP    Patient Active Problem List   Diagnosis Code    Anxiety F41.9    Migraine G43.909    Hypothyroid E03.9    Menorrhagia N92.0    Endometriosis N80.9    Atrial fibrillation ([address] I48.91    Hx of hysterectomy Z90.710       Dear Ms Martha Lopez,    I had the pleasure of seeing  Ms. Prema Soto in the office today. Chief complaint:  No chief complaint on file. Assessment:    PAF ( Atrial fibrillation-diagnosed on 3/12/18-incidental finding during routine office visit.)  Chronic anticoagulation  Anxiety  Hypothyroidism - controlled per pt      Plan:     Saw ANP 6/2018 in f/u - Sleep study referral made but patient has not yet had a sleep study. Her Holter monitor did not show any recurrence of A. fib. She wants to come off the Eliquis. Risks of thromboembolism with A. fib explained to the patient. [id number] score - 1 ; discontinue Eliquis and start aspirin 81 mg daily  Aggressive cardiovascular risk factor modification. Lipids per PCP. Patient understands the plan. All questions were answered to the patient's satisfaction. Medication Side Effects and Warnings were discussed with patient: yes  Patient Labs were reviewed and or requested:  yes  Patient Past Records were reviewed and or requested: yes    I appreciate the opportunity to be involved in Ms. Vincent. See note below for details. Please do not hesitate to contact us with questions or concerns. Caity Hernandez MD    Cardiac Testing/ Procedures: A. Cardiac Cath/PCI:    B.ECHO/MICHELLE:     Echo 4/11/18 - EF 60%. No WMA. Normal atrial dimensions. C.StressNuclear/Stress ECHO/Stress test: Exercise Cardiolite 4/11/18 - 5:00. Normal perfusion.  EF 76%.    D.Vascular:    E. EP: 6/2018 Holter - 24 Hr Holter monitor 6/18/18 - SR, rates 53 to 132. 31 PAC's, 1 episode of atrial tachycardia, frequent PVC's. Diary entry correlates with SR with PVC's - rates recorded 72 to 80 bpm.    F. Miscellaneous:    Subjective:  Randy Alcazar is a 51 y.o. female who returns for follow up visit    Patient is doing well. No chest pain. Occasional transient palpitations. Occasional dizziness when she gets up suddenly from lying to standing position. No swelling lower extremities. Has gained weight. ROS:  (bold if positive, if negative)             Medications before admission:    Current Outpatient Prescriptions   Medication Sig Dispense    levothyroxine (SYNTHROID) 112 mcg tablet TAKE 1 TABLET BY MOUTH EVERY DAY BEFORE BREAKFAST 90 Tab    ELIQUIS 5 mg tablet TAKE 1 TABLET BY MOUTH TWICE A DAY 60 Tab    metoprolol succinate (TOPROL-XL) 25 mg XL tablet TAKE 1 TAB BY MOUTH DAILY. 90 Tab    DIVIGEL 1 mg/gram (0.1 %) glpk 1 Packet by TransDERmal route daily. 90 Packet    levothyroxine (SYNTHROID) 112 mcg tablet TAKE 1 TABLET BY MOUTH EVERY DAY BEFORE BREAKFAST 90 Tab     No current facility-administered medications for this visit. Family History of CAD:    No    Social History:  Current  Smoker No    Physical Exam:  Visit Vitals    /80 (BP 1 Location: Left arm, BP Patient Position: Sitting)    Pulse 72    Ht 5' 9\" (1.753 m)    Wt 214 lb (97.1 kg)    LMP 05/26/2015    SpO2 98%    BMI 31.6 kg/m2          Gen: Well-developed, well-nourished, in no acute distress  Neck: Supple,No JVD, No Carotid Bruit,   Resp: No accessory muscle use, Clear breath sounds, No rales or rhonchi  Card: Regular Rate,Rythm,Normal S1, S2, No murmurs, rubs or gallop. No thrills.    Abd:  Soft, non-tender, non-distended,BS+,   MSK: No cyanosis  Skin: No rashes    Neuro: moving all four extremities , follows commands appropriately  Psych:  Good insight, oriented to person, place , alert, Nml Affect  LE: No edema    EKG:      LABS:        Lab Results   Component Value Date/Time    WBC 5.9 06/08/2018 10:57 AM    HGB 13.3 06/08/2018 10:57 AM    HCT 40.4 06/08/2018 10:57 AM    PLATELET 786 14/81/8377 10:57 AM     Lab Results   Component Value Date/Time    Sodium 142 03/12/2018 09:50 AM    Potassium 4.0 03/12/2018 09:50 AM    Chloride 102 03/12/2018 09:50 AM    CO2 24 03/12/2018 09:50 AM    Anion gap 10 03/17/2010 10:55 AM    Glucose 109 (H) 03/12/2018 09:50 AM    BUN 20 03/12/2018 09:50 AM    Creatinine 1.19 (H) 03/12/2018 09:50 AM    BUN/Creatinine ratio 17 03/12/2018 09:50 AM    GFR est AA 62 03/12/2018 09:50 AM    GFR est non-AA 54 (L) 03/12/2018 09:50 AM    Calcium 9.0 03/12/2018 09:50 AM       No results found for: APTT  No results found for: INR, PTMR, PTP, PT1, PT2  No components found for: LAST LIPIDS    ATTENTION:   This medical record was transcribed using an electronic medical records system. Although proofread, it may and can contain electronic and spelling and other errors. Corrections may be executed at a later time. Please feel free to contact us for any clarifications as needed.         Raquel Goodwin MD

## 2018-10-11 NOTE — PROGRESS NOTES
Patient has some palpitations     Visit Vitals    /80 (BP 1 Location: Left arm, BP Patient Position: Sitting)    Pulse 72    Ht 5' 9\" (1.753 m)    Wt 214 lb (97.1 kg)    SpO2 98%    BMI 31.6 kg/m2    . vital

## 2019-01-04 RX ORDER — LEVOTHYROXINE SODIUM 112 UG/1
TABLET ORAL
Qty: 90 TAB | Refills: 0 | Status: SHIPPED | OUTPATIENT
Start: 2019-01-04 | End: 2019-05-03 | Stop reason: SDUPTHER

## 2019-05-03 RX ORDER — LEVOTHYROXINE SODIUM 112 UG/1
TABLET ORAL
Qty: 90 TAB | Refills: 0 | Status: SHIPPED | OUTPATIENT
Start: 2019-05-03 | End: 2019-08-01 | Stop reason: SDUPTHER

## 2019-08-02 RX ORDER — LEVOTHYROXINE SODIUM 112 UG/1
TABLET ORAL
Qty: 90 TAB | Refills: 0 | Status: SHIPPED | OUTPATIENT
Start: 2019-08-02 | End: 2019-08-05 | Stop reason: ALTCHOICE

## 2019-08-05 ENCOUNTER — OFFICE VISIT (OUTPATIENT)
Dept: FAMILY MEDICINE CLINIC | Age: 49
End: 2019-08-05

## 2019-08-05 VITALS
BODY MASS INDEX: 31.87 KG/M2 | DIASTOLIC BLOOD PRESSURE: 84 MMHG | RESPIRATION RATE: 16 BRPM | WEIGHT: 215.8 LBS | TEMPERATURE: 97.8 F | OXYGEN SATURATION: 98 % | HEART RATE: 61 BPM | SYSTOLIC BLOOD PRESSURE: 124 MMHG

## 2019-08-05 DIAGNOSIS — Z00.00 WELL ADULT EXAM: Primary | ICD-10-CM

## 2019-08-05 DIAGNOSIS — E03.1 CONGENITAL HYPOTHYROIDISM WITHOUT GOITER: ICD-10-CM

## 2019-08-05 DIAGNOSIS — I48.19 PERSISTENT ATRIAL FIBRILLATION (HCC): ICD-10-CM

## 2019-08-05 NOTE — PROGRESS NOTES
Sam Andrea is a 51 y.o. female    Chief Complaint   Patient presents with    Labs     thyroid check     1. Have you been to the ER, urgent care clinic since your last visit? Hospitalized since your last visit? No    2. Have you seen or consulted any other health care providers outside of the [address] [place] [address] since your last visit? Include any pap smears or colon screening.  No    Visit Vitals  /84 (BP 1 Location: Left arm, BP Patient Position: Sitting)   Pulse 61   Temp 97.8 °F (36.6 °C) (Oral)   Resp 16   Ht (P) 5' 9\" (1.753 m)   Wt 215 lb 12.8 oz (97.9 kg)   SpO2 98%   BMI (P) 31.87 kg/m²

## 2019-08-05 NOTE — PROGRESS NOTES
Subjective:   51 y.o. female for Well Woman Check. Her gyne and breast care is done elsewhere by her Ob-Gyne physician. Patient Active Problem List    Diagnosis Date Noted    Hx of hysterectomy 06/15/2018    Atrial fibrillation ([facility] [address] 2018    Menorrhagia 2015    Endometriosis 2015    Hypothyroid 2010    Anxiety 2010    Migraine 2010     Current Outpatient Medications   Medication Sig Dispense Refill    potassium (POTASSIMIN PO) Take  by mouth.  horse chestnut seed (HORSE CHESTNUT PO) Take  by mouth.  levothyroxine (SYNTHROID) 112 mcg tablet TAKE 1 TABLET BY MOUTH EVERY DAY BEFORE BREAKFAST 90 Tab 0    metoprolol succinate (TOPROL-XL) 25 mg XL tablet TAKE 1 TAB BY MOUTH DAILY. 90 Tab 3    DIVIGEL 1 mg/gram (0.1 %) glpk 1 Packet by TransDERmal route daily. 80 Packet 3     Family History   Problem Relation Age of Onset    Hypertension Mother    Santiago.Emerson Migraines Mother     Kidney Disease Father      Social History     Tobacco Use    Smoking status: Former Smoker     Packs/day: 0.50     Years: 33.00     Pack years: 16.50     Last attempt to quit: 3/28/2017     Years since quittin.3    Smokeless tobacco: Never Used   Substance Use Topics    Alcohol use: Yes     Comment: occ             ROS: Feeling generally well. No TIA's or unusual headaches, no dysphagia. No prolonged cough. No dyspnea or chest pain on exertion. No abdominal pain, change in bowel habits, black or bloody stools. No urinary tract symptoms. No new or unusual musculoskeletal symptoms. Specific concerns today: needs labs for thyroid. Objective: The patient appears well, alert, oriented x 3, in no distress. Visit Vitals  /84 (BP 1 Location: Left arm, BP Patient Position: Sitting)   Pulse 61   Temp 97.8 °F (36.6 °C) (Oral)   Resp 16   Ht (P) 5' 9\" (1.753 m)   Wt 215 lb 12.8 oz (97.9 kg)   LMP 2015   SpO2 98%   BMI (P) 31.87 kg/m²     ENT normal.  Neck supple.  No adenopathy or thyromegaly. JENNIFER. Lungs are clear, good air entry, no wheezes, rhonchi or rales. S1 and S2 normal, no murmurs, regular rate and rhythm. Abdomen soft without tenderness, guarding, mass or organomegaly. Extremities show no edema, normal peripheral pulses. Neurological is normal, no focal findings. Breast and Pelvic exams are deferred. Assessment/Plan:   Well Woman  lose weight, increase physical activity, follow low fat diet, follow low salt diet, routine labs ordered  Encounter Diagnoses   Name Primary?  Well adult exam Yes    Congenital hypothyroidism without goiter     Persistent atrial fibrillation ([facility] [address]      Orders Placed This Encounter    LIPID PANEL    CBC WITH AUTOMATED DIFF    METABOLIC PANEL, COMPREHENSIVE    TSH 3RD GENERATION    potassium (POTASSIMIN PO)    horse chestnut seed (HORSE CHESTNUT PO)     I have discussed the diagnosis with the patient and the intended plan as seen in the above orders. The patient has received an after-visit summary and questions were answered concerning future plans. Patient conveyed understanding of the plan at the time of the visit.     Julio C Dover, MSN, ANP  8/5/2019

## 2019-08-06 LAB
ALBUMIN SERPL-MCNC: 4.2 G/DL (ref 3.5–5.5)
ALBUMIN/GLOB SERPL: 1.8 {RATIO} (ref 1.2–2.2)
ALP SERPL-CCNC: 79 IU/L (ref 39–117)
ALT SERPL-CCNC: 7 IU/L (ref 0–32)
AST SERPL-CCNC: 12 IU/L (ref 0–40)
BASOPHILS # BLD AUTO: 0 X10E3/UL (ref 0–0.2)
BASOPHILS NFR BLD AUTO: 1 %
BILIRUB SERPL-MCNC: 0.3 MG/DL (ref 0–1.2)
BUN SERPL-MCNC: 14 MG/DL (ref 6–24)
BUN/CREAT SERPL: 16 (ref 9–23)
CALCIUM SERPL-MCNC: 8.8 MG/DL (ref 8.7–10.2)
CHLORIDE SERPL-SCNC: 105 MMOL/L (ref 96–106)
CHOLEST SERPL-MCNC: 168 MG/DL (ref 100–199)
CO2 SERPL-SCNC: 22 MMOL/L (ref 20–29)
CREAT SERPL-MCNC: 0.89 MG/DL (ref 0.57–1)
EOSINOPHIL # BLD AUTO: 0.2 X10E3/UL (ref 0–0.4)
EOSINOPHIL NFR BLD AUTO: 3 %
ERYTHROCYTE [DISTWIDTH] IN BLOOD BY AUTOMATED COUNT: 11.9 % (ref 12.3–15.4)
GLOBULIN SER CALC-MCNC: 2.3 G/DL (ref 1.5–4.5)
GLUCOSE SERPL-MCNC: 81 MG/DL (ref 65–99)
HCT VFR BLD AUTO: 41.5 % (ref 34–46.6)
HDLC SERPL-MCNC: 44 MG/DL
HGB BLD-MCNC: 13.5 G/DL (ref 11.1–15.9)
IMM GRANULOCYTES # BLD AUTO: 0 X10E3/UL (ref 0–0.1)
IMM GRANULOCYTES NFR BLD AUTO: 0 %
INTERPRETATION, 910389: NORMAL
LDLC SERPL CALC-MCNC: 98 MG/DL (ref 0–99)
LYMPHOCYTES # BLD AUTO: 1.5 X10E3/UL (ref 0.7–3.1)
LYMPHOCYTES NFR BLD AUTO: 33 %
MCH RBC QN AUTO: 30.1 PG (ref 26.6–33)
MCHC RBC AUTO-ENTMCNC: 32.5 G/DL (ref 31.5–35.7)
MCV RBC AUTO: 93 FL (ref 79–97)
MONOCYTES # BLD AUTO: 0.4 X10E3/UL (ref 0.1–0.9)
MONOCYTES NFR BLD AUTO: 8 %
NEUTROPHILS # BLD AUTO: 2.6 X10E3/UL (ref 1.4–7)
NEUTROPHILS NFR BLD AUTO: 55 %
PLATELET # BLD AUTO: 248 X10E3/UL (ref 150–450)
POTASSIUM SERPL-SCNC: 4.3 MMOL/L (ref 3.5–5.2)
PROT SERPL-MCNC: 6.5 G/DL (ref 6–8.5)
RBC # BLD AUTO: 4.48 X10E6/UL (ref 3.77–5.28)
SODIUM SERPL-SCNC: 143 MMOL/L (ref 134–144)
TRIGL SERPL-MCNC: 129 MG/DL (ref 0–149)
TSH SERPL DL<=0.005 MIU/L-ACNC: 1.39 UIU/ML (ref 0.45–4.5)
VLDLC SERPL CALC-MCNC: 26 MG/DL (ref 5–40)
WBC # BLD AUTO: 4.7 X10E3/UL (ref 3.4–10.8)

## 2019-08-26 DIAGNOSIS — I48.19 PERSISTENT ATRIAL FIBRILLATION (HCC): ICD-10-CM

## 2019-08-26 RX ORDER — METOPROLOL SUCCINATE 25 MG/1
TABLET, EXTENDED RELEASE ORAL
Qty: 90 TAB | Refills: 3 | Status: SHIPPED | OUTPATIENT
Start: 2019-08-26 | End: 2020-08-28

## 2019-10-15 ENCOUNTER — OFFICE VISIT (OUTPATIENT)
Dept: CARDIOLOGY CLINIC | Age: 49
End: 2019-10-15

## 2019-10-15 VITALS
DIASTOLIC BLOOD PRESSURE: 80 MMHG | OXYGEN SATURATION: 99 % | BODY MASS INDEX: 32.05 KG/M2 | HEART RATE: 66 BPM | WEIGHT: 216.4 LBS | HEIGHT: 69 IN | SYSTOLIC BLOOD PRESSURE: 130 MMHG

## 2019-10-15 DIAGNOSIS — I48.0 PAF (PAROXYSMAL ATRIAL FIBRILLATION) (HCC): Primary | ICD-10-CM

## 2019-10-15 DIAGNOSIS — I49.3 PVC'S (PREMATURE VENTRICULAR CONTRACTIONS): ICD-10-CM

## 2019-10-15 RX ORDER — ASPIRIN 81 MG/1
TABLET ORAL DAILY
COMMUNITY

## 2019-10-15 NOTE — PROGRESS NOTES
Kita Bejarano is a 47 y.o. female    Chief Complaint   Patient presents with    Other     PAF,annual        Chest pain patient states sometimes in the center of her chest.  Feels like a panic attack. SOB some SOB with the CP    Dizziness No    Swelling No    Refills No    Visit Vitals  /80 (BP 1 Location: Left arm, BP Patient Position: Sitting)   Pulse 66   Ht 5' 9\" (1.753 m)   Wt 216 lb 6.4 oz (98.2 kg)   LMP 05/26/2015   SpO2 99%   BMI 31.96 kg/m²       1. Have you been to the ER, urgent care clinic since your last visit? Hospitalized since your last visit? No    2. Have you seen or consulted any other health care providers outside of the [address] [place] [address] since your last visit? Include any pap smears or colon screening.  No

## 2019-10-15 NOTE — PROGRESS NOTES
Carlos Guthrie MD    [id number] [place], [address] [facility]    Office [phone],[facility] [phone]  Pager [phone] is a 47 y.o. female is here for f/u visit. Primary care physician:  Kike Alfonso NP    Patient Active Problem List   Diagnosis Code    Anxiety F41.9    Migraine G43.909    Hypothyroid E03.9    Menorrhagia N92.0    Endometriosis N80.9    Atrial fibrillation ([address] I48.91    Hx of hysterectomy Z90.710       Dear Ms Taty Hart,    I had the pleasure of seeing  Ms. Bertram Tomlinson in the office today. Chief complaint:  Chief Complaint   Patient presents with    Other     PAF,annual        Assessment:    PAF ( Atrial fibrillation-diagnosed on 3/12/18-incidental finding during routine office visit.)  PVC  Chronic anticoagulation  Anxiety  Hypothyroidism - controlled per pt      Plan:     Saw ANP 6/2018 in f/u - Sleep study referral made but patient has not yet had a sleep study. Does not want to get it done. (Previous visit-has not wanted to be on anticoagulation. On aspirin. Holter monitor previously did not show recurrence of A. fib. )Aggressive cardiovascular risk factor modification. Lipids per PCP. Patient understands the plan. All questions were answered to the patient's satisfaction. Medication Side Effects and Warnings were discussed with patient: yes  Patient Labs were reviewed and or requested:  yes  Patient Past Records were reviewed and or requested: yes    I appreciate the opportunity to be involved in Ms. Vincent. See note below for details. Please do not hesitate to contact us with questions or concerns. Carlos Guthrie MD    Cardiac Testing/ Procedures: A. Cardiac Cath/PCI:    B.ECHO/MICHELLE:     Echo 4/11/18 - EF 60%. No WMA. Normal atrial dimensions. C.StressNuclear/Stress ECHO/Stress test: Exercise Cardiolite 4/11/18 - 5:00. Normal perfusion. EF 76%.     D.Vascular:    E. EP: 6/2018 Holter - 24 Hr Holter monitor 6/18/18 - SR, rates 53 to 132. 31 PAC's, 1 episode of atrial tachycardia, frequent PVC's. Diary entry correlates with SR with PVC's - rates recorded 72 to 80 bpm.    F. Miscellaneous:    Subjective:  Thien King is a 47 y.o. female who returns for follow up visit    Patient is doing well. No chest pain. Occasional transient palpitations. Occasional dizziness when she gets up suddenly from lying to standing position. No swelling lower extremities. Has gained weight. ROS:  (bold if positive, if negative)             Medications before admission:    Current Outpatient Medications   Medication Sig Dispense    metoprolol succinate (TOPROL-XL) 25 mg XL tablet TAKE 1 TABLET BY MOUTH EVERY DAY 90 Tab    potassium (POTASSIMIN PO) Take  by mouth.  horse chestnut seed (HORSE CHESTNUT PO) Take  by mouth.  levothyroxine (SYNTHROID) 112 mcg tablet TAKE 1 TABLET BY MOUTH EVERY DAY BEFORE BREAKFAST 90 Tab    DIVIGEL 1 mg/gram (0.1 %) glpk 1 Packet by TransDERmal route daily. 90 Packet     No current facility-administered medications for this visit. Family History of CAD:    No    Social History:  Current  Smoker No    Physical Exam:  Visit Vitals  /80 (BP 1 Location: Left arm, BP Patient Position: Sitting)   Pulse 66   Ht 5' 9\" (1.753 m)   Wt 216 lb 6.4 oz (98.2 kg)   LMP 05/26/2015   SpO2 99%   BMI 31.96 kg/m²          Gen: Well-developed, well-nourished, in no acute distress  Neck: Supple,No JVD, No Carotid Bruit,   Resp: No accessory muscle use, Clear breath sounds, No rales or rhonchi  Card: Regular Rate,Rythm,Normal S1, S2, No murmurs, rubs or gallop. No thrills.    Abd:  Soft, non-tender, non-distended,BS+,   MSK: No cyanosis  Skin: No rashes    Neuro: moving all four extremities , follows commands appropriately  Psych:  Good insight, oriented to person, place , alert, Nml Affect  LE: No edema    EKG: Sinus rhythm, normal axis, PVCs      LABS:        Lab Results   Component Value Date/Time    WBC 4.7 08/05/2019 07:39 AM    HGB 13.5 08/05/2019 07:39 AM    HCT 41.5 08/05/2019 07:39 AM    PLATELET 384 79/94/3615 07:39 AM     Lab Results   Component Value Date/Time    Sodium 143 08/05/2019 07:39 AM    Potassium 4.3 08/05/2019 07:39 AM    Chloride 105 08/05/2019 07:39 AM    CO2 22 08/05/2019 07:39 AM    Anion gap 10 03/17/2010 10:55 AM    Glucose 81 08/05/2019 07:39 AM    BUN 14 08/05/2019 07:39 AM    Creatinine 0.89 08/05/2019 07:39 AM    BUN/Creatinine ratio 16 08/05/2019 07:39 AM    GFR est AA 88 08/05/2019 07:39 AM    GFR est non-AA 76 08/05/2019 07:39 AM    Calcium 8.8 08/05/2019 07:39 AM       No results found for: APTT  No results found for: INR, PTMR, PTP, PT1, PT2, INREXT, INREXT  No components found for: LAST LIPIDS    ATTENTION:   This medical record was transcribed using an electronic medical records system. Although proofread, it may and can contain electronic and spelling and other errors. Corrections may be executed at a later time. Please feel free to contact us for any clarifications as needed.         Deanna Maldonado MD

## 2019-10-15 NOTE — LETTER
10/15/19 Patient: Dania Watt YOB: 1970 Date of Visit: 10/15/2019 Juvenal Hawkins NP 
[address] [place] [id number] [place] [address] VIA In Basket Dear Juvenal Hawkins NP, Thank you for referring Ms. Samantha Quesada to [facility] for evaluation. My notes for this consultation are attached. If you have questions, please do not hesitate to call me. I look forward to following your patient along with you. Sincerely, Lex Reed MD

## 2019-10-18 DIAGNOSIS — Z79.890 HORMONE REPLACEMENT THERAPY: ICD-10-CM

## 2019-10-18 RX ORDER — ESTRADIOL 1 MG/G
1 GEL TOPICAL DAILY
Qty: 90 PACKET | Refills: 0 | Status: SHIPPED | OUTPATIENT
Start: 2019-10-18 | End: 2020-03-20 | Stop reason: SDUPTHER

## 2019-10-18 NOTE — TELEPHONE ENCOUNTER
47year old patient last seen in the office on 5/9/18 and has next appointment on 12/16/19    Prescription refill sent as per MD to get patient to her scheduled appointment.

## 2019-10-24 RX ORDER — LEVOTHYROXINE SODIUM 112 UG/1
TABLET ORAL
Qty: 90 TAB | Refills: 0 | Status: SHIPPED | OUTPATIENT
Start: 2019-10-24 | End: 2020-07-28 | Stop reason: SDUPTHER

## 2019-10-30 RX ORDER — LEVOTHYROXINE SODIUM 112 UG/1
TABLET ORAL
Qty: 90 TAB | Refills: 0 | Status: SHIPPED | OUTPATIENT
Start: 2019-10-30 | End: 2020-04-30

## 2020-03-20 DIAGNOSIS — Z79.890 HORMONE REPLACEMENT THERAPY: ICD-10-CM

## 2020-03-20 RX ORDER — ESTRADIOL 1 MG/G
1 GEL TOPICAL DAILY
Qty: 90 PACKET | Refills: 0 | Status: SHIPPED | OUTPATIENT
Start: 2020-03-20 | End: 2020-11-02 | Stop reason: SDUPTHER

## 2020-03-20 NOTE — TELEPHONE ENCOUNTER
47year old patient last seen in the office on 5/9/18. Patient last seen in the office on 4/13/2020. Prescription refill sent as per MD order to patient preferred pharmacy.     Patient sent a my chart message

## 2020-04-30 RX ORDER — LEVOTHYROXINE SODIUM 112 UG/1
TABLET ORAL
Qty: 90 TAB | Refills: 0 | Status: SHIPPED | OUTPATIENT
Start: 2020-04-30 | End: 2020-07-28

## 2020-08-04 RX ORDER — LEVOTHYROXINE SODIUM 112 UG/1
TABLET ORAL
Qty: 90 TAB | Refills: 1 | Status: SHIPPED | OUTPATIENT
Start: 2020-08-04 | End: 2020-08-13 | Stop reason: SDUPTHER

## 2020-08-13 RX ORDER — LEVOTHYROXINE SODIUM 112 UG/1
TABLET ORAL
Qty: 90 TAB | Refills: 1 | Status: SHIPPED | OUTPATIENT
Start: 2020-08-13 | End: 2021-05-11

## 2020-08-27 DIAGNOSIS — I48.19 PERSISTENT ATRIAL FIBRILLATION (HCC): ICD-10-CM

## 2020-08-28 RX ORDER — METOPROLOL SUCCINATE 25 MG/1
TABLET, EXTENDED RELEASE ORAL
Qty: 90 TAB | Refills: 0 | Status: SHIPPED | OUTPATIENT
Start: 2020-08-28 | End: 2020-12-21

## 2020-08-28 NOTE — TELEPHONE ENCOUNTER
Requested Prescriptions     Signed Prescriptions Disp Refills    metoprolol succinate (TOPROL-XL) 25 mg XL tablet 90 Tab 0     Sig: TAKE 1 TABLET BY MOUTH EVERY DAY     Authorizing Provider: Maria A Tamayo     Ordering User: Jarad Brown

## 2020-10-15 RX ORDER — AZITHROMYCIN 250 MG/1
TABLET, FILM COATED ORAL
Qty: 6 TAB | Refills: 0 | Status: SHIPPED | OUTPATIENT
Start: 2020-10-15 | End: 2020-10-20

## 2020-10-20 ENCOUNTER — OFFICE VISIT (OUTPATIENT)
Dept: CARDIOLOGY CLINIC | Age: 50
End: 2020-10-20
Payer: COMMERCIAL

## 2020-10-20 VITALS
OXYGEN SATURATION: 97 % | BODY MASS INDEX: 32.67 KG/M2 | WEIGHT: 220.6 LBS | DIASTOLIC BLOOD PRESSURE: 76 MMHG | HEIGHT: 69 IN | SYSTOLIC BLOOD PRESSURE: 128 MMHG

## 2020-10-20 DIAGNOSIS — I49.3 PVC'S (PREMATURE VENTRICULAR CONTRACTIONS): ICD-10-CM

## 2020-10-20 DIAGNOSIS — I48.0 PAF (PAROXYSMAL ATRIAL FIBRILLATION) (HCC): Primary | ICD-10-CM

## 2020-10-20 PROCEDURE — 99213 OFFICE O/P EST LOW 20 MIN: CPT | Performed by: INTERNAL MEDICINE

## 2020-10-20 PROCEDURE — 93000 ELECTROCARDIOGRAM COMPLETE: CPT | Performed by: INTERNAL MEDICINE

## 2020-10-20 NOTE — PROGRESS NOTES
Alvin Pop MD    [id number] [place] [place], [address] [facility]    Office [phone],[facility] [phone]      Brittany Reyes is a 45 y.o. female is here for f/u visit. Primary care physician:  Jayant Ramirez NP    Patient Active Problem List   Diagnosis Code    Anxiety F41.9    Migraine G43.909    Hypothyroid E03.9    Menorrhagia N92.0    Endometriosis N80.9    Atrial fibrillation ([address] I48.91    Hx of hysterectomy Z90.710       Dear Ms Tanja Pérezgenaro,    I had the pleasure of seeing  Ms. Brittany Reyes in the office today. Chief complaint:  Chief Complaint   Patient presents with    Other     PVC, PAF       Assessment:    Hx of PAF ( Atrial fibrillation-diagnosed on 3/12/18-incidental finding during routine office visit.). Today in Afib - aysmptomatic  PVC  Anxiety  Hypothyroidism - controlled per pt      Plan: On ASA/metoprolol. Inc Metoprolo XL 25mg to 2 tab daily. Monitor BP. Does not want to be on anticoagulation addressed again today. Understands risks of thromboembolism. Monitor blood pressure. Patient has a blood pressure monitor at home. Follow-up 3 months/earlier as needed/echocardiogram prior to visit. (Saw ANP 6/2018 in f/u - Sleep study referral made but patient has not yet had a sleep study. Does not want to get it done. Previous visit-has not wanted to be on anticoagulation. On aspirin. Holter monitor previously did not show recurrence of A. fib.  )  Addressed sleep study referral again today. Aggressive cardiovascular risk factor modification. Lipids/TSH per PCP. Advised weight loss. Patient understands the plan. All questions were answered to the patient's satisfaction. Medication Side Effects and Warnings were discussed with patient: yes  Patient Labs were reviewed and or requested:  yes  Patient Past Records were reviewed and or requested: yes    I appreciate the opportunity to be involved in Ms. Vincent.  See note below for details. Please do not hesitate to contact us with questions or concerns. Jerome Young MD    Cardiac Testing/ Procedures: A. Cardiac Cath/PCI:    B.ECHO/MICHELLE:     Echo 4/11/18 - EF 60%. No WMA. Normal atrial dimensions. C.StressNuclear/Stress ECHO/Stress test: Exercise Cardiolite 4/11/18 - 5:00. Normal perfusion. EF 76%. D.Vascular:    E. EP: 6/2018 Holter - 24 Hr Holter monitor 6/18/18 - SR, rates 53 to 132. 31 PAC's, 1 episode of atrial tachycardia, frequent PVC's. Diary entry correlates with SR with PVC's - rates recorded 72 to 80 bpm.    F. Miscellaneous:    Subjective:  Tootie Kiser is a 45 y.o. female who returns for follow up visit    Patient is doing well. No chest pain. No palpitations. .  No swelling lower extremities. Has gained weight. ROS:  (bold if positive, if negative)             Medications before admission:    Current Outpatient Medications   Medication Sig Dispense    azithromycin (ZITHROMAX) 250 mg tablet Take 2 tablets today, then take 1 tablet daily 6 Tab    metoprolol succinate (TOPROL-XL) 25 mg XL tablet TAKE 1 TABLET BY MOUTH EVERY DAY 90 Tab    levothyroxine (SYNTHROID) 112 mcg tablet TAKE 1 TABLET BY MOUTH EVERY DAY BEFORE BREAKFAST 90 Tab    DivigeL 1 mg/gram (0.1 %) glpk 1 Packet by TransDERmal route daily. 90 Packet    aspirin delayed-release 81 mg tablet Take  by mouth daily.  potassium (POTASSIMIN PO) Take  by mouth.  horse chestnut seed (HORSE CHESTNUT PO) Take  by mouth. No current facility-administered medications for this visit.         Family History of CAD:    No    Social History:  Current  Smoker No    Physical Exam:  Visit Vitals  /76 (BP 1 Location: Left arm, BP Patient Position: Sitting)   Ht 5' 9\" (1.753 m)   Wt 220 lb 9.6 oz (100.1 kg)   LMP 05/26/2015   SpO2 97%   BMI 32.58 kg/m²          Gen: Well-developed, well-nourished, in no acute distress  Neck: Supple,No JVD, No Carotid Bruit,   Resp: No accessory muscle use, Clear breath sounds, No rales or rhonchi  Card: Irregular Rate,Rythm,Normal S1, S2, No murmurs, rubs or gallop. No thrills. Abd:  Soft, non-tender, non-distended,BS+,   MSK: No cyanosis  Skin: No rashes    Neuro: moving all four extremities , follows commands appropriately  Psych:  Good insight, oriented to person, place , alert, Nml Affect  LE: No edema    EKG: Afib with RVR/NSTT    LABS:        Lab Results   Component Value Date/Time    WBC 4.7 08/05/2019 07:39 AM    HGB 13.5 08/05/2019 07:39 AM    HCT 41.5 08/05/2019 07:39 AM    PLATELET 047 58/15/1557 07:39 AM     Lab Results   Component Value Date/Time    Sodium 143 08/05/2019 07:39 AM    Potassium 4.3 08/05/2019 07:39 AM    Chloride 105 08/05/2019 07:39 AM    CO2 22 08/05/2019 07:39 AM    Anion gap 10 03/17/2010 10:55 AM    Glucose 81 08/05/2019 07:39 AM    BUN 14 08/05/2019 07:39 AM    Creatinine 0.89 08/05/2019 07:39 AM    BUN/Creatinine ratio 16 08/05/2019 07:39 AM    GFR est AA 88 08/05/2019 07:39 AM    GFR est non-AA 76 08/05/2019 07:39 AM    Calcium 8.8 08/05/2019 07:39 AM       No results found for: APTT  No results found for: INR, PTMR, PTP, PT1, PT2, INREXT, INREXT  No components found for: LAST LIPIDS    ATTENTION:   This medical record was transcribed using an electronic medical records system. Although proofread, it may and can contain electronic and spelling and other errors. Corrections may be executed at a later time. Please feel free to contact us for any clarifications as needed.         Abril Holly MD

## 2020-11-02 ENCOUNTER — OFFICE VISIT (OUTPATIENT)
Dept: OBGYN CLINIC | Age: 50
End: 2020-11-02
Payer: COMMERCIAL

## 2020-11-02 VITALS — WEIGHT: 220 LBS | DIASTOLIC BLOOD PRESSURE: 63 MMHG | SYSTOLIC BLOOD PRESSURE: 134 MMHG | BODY MASS INDEX: 32.49 KG/M2

## 2020-11-02 DIAGNOSIS — Z79.890 HORMONE REPLACEMENT THERAPY: ICD-10-CM

## 2020-11-02 DIAGNOSIS — Z01.419 ENCOUNTER FOR GYNECOLOGICAL EXAMINATION (GENERAL) (ROUTINE) WITHOUT ABNORMAL FINDINGS: Primary | ICD-10-CM

## 2020-11-02 PROCEDURE — 99396 PREV VISIT EST AGE 40-64: CPT | Performed by: OBSTETRICS & GYNECOLOGY

## 2020-11-02 RX ORDER — ESTRADIOL 1 MG/G
1 GEL TOPICAL DAILY
Qty: 90 PACKET | Refills: 4 | Status: SHIPPED | OUTPATIENT
Start: 2020-11-02

## 2020-11-02 NOTE — PROGRESS NOTES
Corey Salas is a ,  45 y.o. female [facility] whose Patient's last menstrual period was 2015. was on  who presents for her annual checkup. She is having no significant problems. Hormone Status:    She is not having vasomotor symptoms. The patient is using HRT: divigel    Sexual history:    She  reports being sexually active and has had partner(s) who are Male. She reports using the following method of birth control/protection: Surgical.    Medical conditions:    Since her last annual GYN exam about two years ago, she has had the following changes in her health history: none. Pap and Mammogram History:    Her most recent Pap smear was negative HPV NEG obtained 18. The patient has not had a recent mammogram.    Breast Cancer History/Substance Abuse:    She has no and a family history of breast cancer. Osteoporosis History:    Family history does not include a first or second degree relative with osteopenia or osteoporosis. A bone density scan has not been done. She is currently taking calcium and vit D. Past Medical History:   Diagnosis Date    Anxiety 2010    Atrial fibrillation ([address] 2018    Hypothyroid 2010    Ill-defined condition     depression    Migraine 2010    Psychotic disorder [facility])      Past Surgical History:   Procedure Laterality Date    ABDOMEN SURGERY PROC UNLISTED      laparotomy with appendectomy    HX HYSTERECTOMY  2015    [facility]an LTH/BSO    HX LEFT SALPINGECTOMY      ectopic    HX ORTHOPAEDIC Right     heel and wrist    HX TUBAL LIGATION       Tobacco History:  reports that she quit smoking about 3 years ago. She has a 16.50 pack-year smoking history. She has never used smokeless tobacco.  Alcohol Abuse:  reports current alcohol use. Drug Abuse:  reports no history of drug use.   Current Outpatient Medications   Medication Sig Dispense Refill    metoprolol succinate (TOPROL-XL) 25 mg XL tablet TAKE 1 TABLET BY MOUTH EVERY DAY 90 Tab 0    levothyroxine (SYNTHROID) 112 mcg tablet TAKE 1 TABLET BY MOUTH EVERY DAY BEFORE BREAKFAST 90 Tab 1    DivigeL 1 mg/gram (0.1 %) glpk 1 Packet by TransDERmal route daily. 90 Packet 0    aspirin delayed-release 81 mg tablet Take  by mouth daily.  potassium (POTASSIMIN PO) Take  by mouth.  horse chestnut seed (HORSE CHESTNUT PO) Take  by mouth.        Allergies: Doxycycline and Pcn [penicillins]   Social History     Socioeconomic History    Marital status:      Spouse name: Not on file    Number of children: Not on file    Years of education: Not on file    Highest education level: Not on file   Occupational History    Not on file   Social Needs    Financial resource strain: Not on file    Food insecurity     Worry: Not on file     Inability: Not on file    Transportation needs     Medical: Not on file     Non-medical: Not on file   Tobacco Use    Smoking status: Former Smoker     Packs/day: 0.50     Years: 33.00     Pack years: 16.50     Last attempt to quit: 3/28/2017     Years since quitting: 3.6    Smokeless tobacco: Never Used   Substance and Sexual Activity    Alcohol use: Yes     Comment: occ    Drug use: No    Sexual activity: Yes     Partners: Male     Birth control/protection: Surgical   Lifestyle    Physical activity     Days per week: Not on file     Minutes per session: Not on file    Stress: Not on file   Relationships    Social connections     Talks on phone: Not on file     Gets together: Not on file     Attends [employer] service: Not on file     Active member of club or organization: Not on file     Attends meetings of clubs or organizations: Not on file     Relationship status: Not on file    Intimate partner violence     Fear of current or ex partner: Not on file     Emotionally abused: Not on file     Physically abused: Not on file     Forced sexual activity: Not on file   Other Topics Concern    Not on file   Social History Narrative    Not on file     Patient Active Problem List   Diagnosis Code    Anxiety F41.9    Migraine G43.909    Hypothyroid E03.9    Menorrhagia N92.0    Endometriosis N80.9    Atrial fibrillation ([facility] I48.91    Hx of hysterectomy Z90.710       Review of Systems - History obtained from the patient  Constitutional: negative for weight loss, fever, night sweats  HEENT: negative for hearing loss, earache, congestion, snoring, sorethroat  CV: negative for chest pain, palpitations, edema  Resp: negative for cough, shortness of breath, wheezing  GI: negative for change in bowel habits, abdominal pain, black or bloody stools  : negative for frequency, dysuria, hematuria, vaginal discharge  MSK: negative for back pain, joint pain, muscle pain  Breast: negative for breast lumps, nipple discharge, galactorrhea  Skin :negative for itching, rash, hives  Neuro: negative for dizziness, headache, confusion, weakness  Psych: negative for anxiety, depression, change in mood  Heme/lymph: negative for bleeding, bruising, pallor    Physical Exam    Visit Vitals  /63   Wt 220 lb (99.8 kg)   LMP 05/26/2015   BMI 32.49 kg/m²     Constitutional  · Appearance: well-nourished, well developed, alert, in no acute distress    HENT  · Head and Face: appears normal    Neck  · Inspection/Palpation: normal appearance, no masses or tenderness  · Lymph Nodes: no lymphadenopathy present  · Thyroid: gland size normal, nontender, no nodules or masses present on palpation    Chest  · Respiratory Effort: breathing normal        Auscultation: normal breath sounds    Cardiovascular  · Heart:  · Auscultation: regular rate and rhythm without murmur    Breasts  · Inspection of Breasts: breasts symmetrical, no skin changes, no discharge present, nipple appearance normal, no skin retraction present  · Palpation of Breasts and Axillae: no masses present on palpation, no breast tenderness  · Axillary Lymph Nodes: no lymphadenopathy present    Gastrointestinal  · Abdominal Examination: abdomen non-tender to palpation, normal bowel sounds, no masses present  · Liver and spleen: no hepatomegaly present, spleen not palpable  · Hernias: no hernias identified    Genitourinary  · External Genitalia: normal appearance for age, no discharge present, no tenderness present, no inflammatory lesions present, no masses present, no atrophy present  · Vagina: normal vaginal vault without central or paravaginal defects, no discharge present, no inflammatory lesions present, no masses present  · Bladder: non-tender to palpation  · Urethra: appears normal  · Cervix: absent  · Uterus: absent  · Adnexa: no adnexal tenderness present, no adnexal masses present  · Perineum: perineum within normal limits, no evidence of trauma, no rashes or skin lesions present  · Anus: anus within normal limits, no hemorrhoids present  · Inguinal Lymph Nodes: no lymphadenopathy present      Skin  · General Inspection: no rash, no lesions identified    Neurologic/Psychiatric  · Mental Status:  · Orientation: grossly oriented to person, place and time  · Mood and Affect: mood normal, affect appropriate    . Assessment:  Routine gynecologic examination  Her current medical status is satisfactory with no evidence of significant gynecologic issues.     Plan:  Counseled re: diet, exercise, healthy lifestyle  Return for yearly wellness visits  Rec annual mammogram  Cont divigel

## 2020-11-23 ENCOUNTER — OFFICE VISIT (OUTPATIENT)
Dept: FAMILY MEDICINE CLINIC | Age: 50
End: 2020-11-23
Payer: COMMERCIAL

## 2020-11-23 VITALS
DIASTOLIC BLOOD PRESSURE: 73 MMHG | TEMPERATURE: 98.7 F | HEART RATE: 58 BPM | SYSTOLIC BLOOD PRESSURE: 113 MMHG | OXYGEN SATURATION: 96 % | WEIGHT: 223 LBS | HEIGHT: 69 IN | RESPIRATION RATE: 16 BRPM | BODY MASS INDEX: 33.03 KG/M2

## 2020-11-23 DIAGNOSIS — E03.1 CONGENITAL HYPOTHYROIDISM WITHOUT GOITER: ICD-10-CM

## 2020-11-23 DIAGNOSIS — Z00.00 WELL ADULT EXAM: Primary | ICD-10-CM

## 2020-11-23 PROCEDURE — 99396 PREV VISIT EST AGE 40-64: CPT | Performed by: NURSE PRACTITIONER

## 2020-11-23 RX ORDER — OFLOXACIN 3 MG/ML
5 SOLUTION AURICULAR (OTIC) DAILY
Qty: 5 ML | Refills: 0 | Status: SHIPPED | OUTPATIENT
Start: 2020-11-23 | End: 2020-11-30

## 2020-11-23 RX ORDER — LEVOCETIRIZINE DIHYDROCHLORIDE 5 MG/1
5 TABLET, FILM COATED ORAL DAILY
Qty: 90 TAB | Refills: 3 | Status: SHIPPED | OUTPATIENT
Start: 2020-11-23 | End: 2021-11-28

## 2020-11-23 NOTE — PROGRESS NOTES
Chief Complaint   Patient presents with    Labs     Pt in office today for labs  -thyroid  Pt has concerns with her ear  -pt states that she feels like her ear is closed    1. Have you been to the ER, urgent care clinic since your last visit? Hospitalized since your last visit? No    2. Have you seen or consulted any other health care providers outside of the [address] [place] [address] since your last visit? Include any pap smears or colon screening.  No     Pt has no other concerns

## 2020-11-24 LAB
ALBUMIN SERPL-MCNC: 4.3 G/DL (ref 3.5–5)
ALBUMIN/GLOB SERPL: 1.4 {RATIO} (ref 1.1–2.2)
ALP SERPL-CCNC: 94 U/L (ref 45–117)
ALT SERPL-CCNC: 13 U/L (ref 12–78)
ANION GAP SERPL CALC-SCNC: 4 MMOL/L (ref 5–15)
AST SERPL-CCNC: 16 U/L (ref 15–37)
BASOPHILS # BLD: 0.1 K/UL (ref 0–0.1)
BASOPHILS NFR BLD: 1 % (ref 0–1)
BILIRUB SERPL-MCNC: 0.3 MG/DL (ref 0.2–1)
BUN SERPL-MCNC: 20 MG/DL (ref 6–20)
BUN/CREAT SERPL: 22 (ref 12–20)
CALCIUM SERPL-MCNC: 9.2 MG/DL (ref 8.5–10.1)
CHLORIDE SERPL-SCNC: 108 MMOL/L (ref 97–108)
CHOLEST SERPL-MCNC: 195 MG/DL
CO2 SERPL-SCNC: 28 MMOL/L (ref 21–32)
CREAT SERPL-MCNC: 0.93 MG/DL (ref 0.55–1.02)
DIFFERENTIAL METHOD BLD: NORMAL
EOSINOPHIL # BLD: 0.2 K/UL (ref 0–0.4)
EOSINOPHIL NFR BLD: 4 % (ref 0–7)
ERYTHROCYTE [DISTWIDTH] IN BLOOD BY AUTOMATED COUNT: 13.3 % (ref 11.5–14.5)
GLOBULIN SER CALC-MCNC: 3 G/DL (ref 2–4)
GLUCOSE SERPL-MCNC: 95 MG/DL (ref 65–100)
HCT VFR BLD AUTO: 40.3 % (ref 35–47)
HDLC SERPL-MCNC: 46 MG/DL
HDLC SERPL: 4.2 {RATIO} (ref 0–5)
HGB BLD-MCNC: 12.6 G/DL (ref 11.5–16)
IMM GRANULOCYTES # BLD AUTO: 0 K/UL (ref 0–0.04)
IMM GRANULOCYTES NFR BLD AUTO: 0 % (ref 0–0.5)
LDLC SERPL CALC-MCNC: 120.8 MG/DL (ref 0–100)
LIPID PROFILE,FLP: ABNORMAL
LYMPHOCYTES # BLD: 1.4 K/UL (ref 0.8–3.5)
LYMPHOCYTES NFR BLD: 30 % (ref 12–49)
MCH RBC QN AUTO: 30.1 PG (ref 26–34)
MCHC RBC AUTO-ENTMCNC: 31.3 G/DL (ref 30–36.5)
MCV RBC AUTO: 96.4 FL (ref 80–99)
MONOCYTES # BLD: 0.3 K/UL (ref 0–1)
MONOCYTES NFR BLD: 7 % (ref 5–13)
NEUTS SEG # BLD: 2.7 K/UL (ref 1.8–8)
NEUTS SEG NFR BLD: 58 % (ref 32–75)
NRBC # BLD: 0 K/UL (ref 0–0.01)
NRBC BLD-RTO: 0 PER 100 WBC
PLATELET # BLD AUTO: 261 K/UL (ref 150–400)
PMV BLD AUTO: 10.3 FL (ref 8.9–12.9)
POTASSIUM SERPL-SCNC: 4.5 MMOL/L (ref 3.5–5.1)
PROT SERPL-MCNC: 7.3 G/DL (ref 6.4–8.2)
RBC # BLD AUTO: 4.18 M/UL (ref 3.8–5.2)
SODIUM SERPL-SCNC: 140 MMOL/L (ref 136–145)
TRIGL SERPL-MCNC: 141 MG/DL (ref ?–150)
TSH SERPL DL<=0.05 MIU/L-ACNC: 3.23 UIU/ML (ref 0.36–3.74)
VLDLC SERPL CALC-MCNC: 28.2 MG/DL
WBC # BLD AUTO: 4.6 K/UL (ref 3.6–11)

## 2020-11-25 NOTE — PROGRESS NOTES
Subjective:   45 y.o. female for Well Woman Check. Her gyne and breast care is done elsewhere by her Ob-Gyne physician. Patient Active Problem List    Diagnosis Date Noted    Hx of hysterectomy 06/15/2018    Atrial fibrillation ([facility] 03/12/2018    Menorrhagia 05/27/2015    Endometriosis 05/27/2015    Hypothyroid 08/26/2010    Anxiety 02/25/2010    Migraine 02/25/2010     Current Outpatient Medications   Medication Sig Dispense Refill    levocetirizine (XYZAL) 5 mg tablet Take 1 Tab by mouth daily. 90 Tab 3    ofloxacin (FLOXIN) 0.3 % otic solution Administer 5 Drops in left ear daily for 7 days. 5 mL 0    DivigeL 1 mg/gram (0.1 %) glpk 1 Packet by TransDERmal route daily. 90 Packet 4    metoprolol succinate (TOPROL-XL) 25 mg XL tablet TAKE 1 TABLET BY MOUTH EVERY DAY 90 Tab 0    levothyroxine (SYNTHROID) 112 mcg tablet TAKE 1 TABLET BY MOUTH EVERY DAY BEFORE BREAKFAST 90 Tab 1    aspirin delayed-release 81 mg tablet Take  by mouth daily.  potassium (POTASSIMIN PO) Take  by mouth.  horse chestnut seed (HORSE CHESTNUT PO) Take  by mouth. Family History   Problem Relation Age of Onset    Hypertension Mother    [facility] Migraines Mother     Kidney Disease Father      Social History     Tobacco Use    Smoking status: Former Smoker     Packs/day: 0.50     Years: 33.00     Pack years: 16.50     Last attempt to quit: 3/28/2017     Years since quitting: 3.6    Smokeless tobacco: Never Used   Substance Use Topics    Alcohol use: Yes     Comment: occ             ROS: Feeling generally well. No TIA's or unusual headaches, no dysphagia. No prolonged cough. No dyspnea or chest pain on exertion. No abdominal pain, change in bowel habits, black or bloody stools. No urinary tract symptoms. No new or unusual musculoskeletal symptoms. Specific concerns today: none. Objective: The patient appears well, alert, oriented x 3, in no distress.   Visit Vitals  /73 (BP 1 Location: Left arm, BP Patient Position: Sitting)   Pulse (!) 58   Temp 98.7 °F (37.1 °C) (Oral)   Resp 16   Ht 5' 9\" (1.753 m)   Wt 223 lb (101.2 kg)   LMP 05/26/2015   SpO2 96%   BMI 32.93 kg/m²     ENT normal.  Neck supple. No adenopathy or thyromegaly. JENNIFER. Lungs are clear, good air entry, no wheezes, rhonchi or rales. S1 and S2 normal, no murmurs, regular rate and rhythm. Abdomen soft without tenderness, guarding, mass or organomegaly. Extremities show no edema, normal peripheral pulses. Neurological is normal, no focal findings. Breast and Pelvic exams are deferred. Assessment/Plan:   Well Woman  lose weight, increase physical activity, follow low fat diet, follow low salt diet, routine labs ordered  Encounter Diagnoses   Name Primary?  Well adult exam Yes    Congenital hypothyroidism without goiter      Orders Placed This Encounter    LIPID PANEL    CBC WITH AUTOMATED DIFF    METABOLIC PANEL, COMPREHENSIVE    TSH 3RD GENERATION    levocetirizine (XYZAL) 5 mg tablet    ofloxacin (FLOXIN) 0.3 % otic solution     I have discussed the diagnosis with the patient and the intended plan as seen in the above orders. The patient has received an after-visit summary and questions were answered concerning future plans. Patient conveyed understanding of the plan at the time of the visit.     Jesus Galeana, MSN, ANP  11/24/2020

## 2020-12-21 DIAGNOSIS — I48.19 PERSISTENT ATRIAL FIBRILLATION (HCC): ICD-10-CM

## 2020-12-21 RX ORDER — METOPROLOL SUCCINATE 50 MG/1
50 TABLET, EXTENDED RELEASE ORAL DAILY
Qty: 90 TAB | Refills: 0 | Status: SHIPPED | OUTPATIENT
Start: 2020-12-21 | End: 2021-03-16

## 2021-05-11 RX ORDER — LEVOTHYROXINE SODIUM 112 UG/1
TABLET ORAL
Qty: 90 TAB | Refills: 1 | Status: SHIPPED | OUTPATIENT
Start: 2021-05-11 | End: 2021-11-04

## 2021-10-23 NOTE — PROGRESS NOTES
Elinor Anderson MD    [id number] [facility] [place], [address] [facility]    Office [phone],[place] [phone]      Cecelia Medrano is a 45 y.o. female is here for f/u visit. Primary care physician:  Tony Viramontes NP        Dear Ms Noman Tierney,    I had the pleasure of seeing  Ms. Cecelia Medrano in the office today. Chief complaint:  Chief Complaint   Patient presents with    Follow-up     annual PVC's, PAF       Assessment:    Hx of PAF ( Atrial fibrillation-diagnosed on 3/12/18-incidental finding during routine office visit.). Today in Afib - aysmptomatic  PVC  Anxiety  Hypothyroidism - controlled per pt      Plan: On ASA/metoprolol. Have discussed before and has not wanted to be on anticoagulation  Monitor blood pressure. Patient has a blood pressure monitor at home. Follow-up 12 months/earlier as needed  (Saw ANP 6/2018 in f/u - Sleep study referral made but patient has not yet had a sleep study. Does not want to get it done. Previous visit-has not wanted to be on anticoagulation. On aspirin. Holter monitor previously did not show recurrence of A. fib.  )  Aggressive cardiovascular risk factor modification. Lipids 11/2020 /TSH per PCP. Advised weight loss. The 10-year ASCVD risk score (Alicia Kobs., et al., 2013) is: 1.5%      Patient understands the plan. All questions were answered to the patient's satisfaction. Medication Side Effects and Warnings were discussed with patient: yes  Patient Labs were reviewed and or requested:  yes  Patient Past Records were reviewed and or requested: yes    I appreciate the opportunity to be involved in Ms. Vincent. See note below for details. Please do not hesitate to contact us with questions or concerns. Elinor Anderson MD    Cardiac Testing/ Procedures: A. Cardiac Cath/PCI:    B.ECHO/MICHELLE:     Echo 4/11/18 - EF 60%. No WMA. Normal atrial dimensions.     C.StressNuclear/Stress ECHO/Stress test: Exercise Cardiolite 4/11/18 - 5:00. Normal perfusion. EF 76%. D.Vascular:    E. EP: 6/2018 Holter - 24 Hr Holter monitor 6/18/18 - SR, rates 53 to 132. 31 PAC's, 1 episode of atrial tachycardia, frequent PVC's. Diary entry correlates with SR with PVC's - rates recorded 72 to 80 bpm.    F. Miscellaneous:    Subjective:  Joanna Brown is a 45 y.o. female who returns for follow up visit    Patient is doing well. No chest pain. No palpitations. .  No swelling lower extremities. Has gained weight. ROS:  (bold if positive, if negative)             Medications before admission:    Current Outpatient Medications   Medication Sig Dispense    levothyroxine (SYNTHROID) 112 mcg tablet TAKE 1 TABLET BY MOUTH EVERY DAY BEFORE BREAKFAST 90 Tab    metoprolol succinate (TOPROL-XL) 50 mg XL tablet TAKE 1 TABLET BY MOUTH EVERY DAY 90 Tab    levocetirizine (XYZAL) 5 mg tablet Take 1 Tab by mouth daily. 90 Tab    DivigeL 1 mg/gram (0.1 %) glpk 1 Packet by TransDERmal route daily. 90 Packet    aspirin delayed-release 81 mg tablet Take  by mouth daily.  potassium (POTASSIMIN PO) Take  by mouth daily.  horse chestnut seed (HORSE CHESTNUT PO) Take  by mouth. No current facility-administered medications for this visit. Family History of CAD:    No    Social History:  Current  Smoker No    Physical Exam:  Visit Vitals  /74 (BP 1 Location: Left upper arm, BP Patient Position: Sitting)   Pulse 66   Ht 5' 9\" (1.753 m)   Wt 230 lb (104.3 kg)   LMP 05/26/2015   SpO2 97%   BMI 33.97 kg/m²          Gen: Well-developed, well-nourished, in no acute distress  Neck: Supple,No JVD, No Carotid Bruit,   Resp: No accessory muscle use, Clear breath sounds, No rales or rhonchi  Card: Irregular Rate,Rythm,Normal S1, S2, No murmurs, rubs or gallop. No thrills.    Abd:  Soft, BS+,   MSK: No cyanosis  Skin: No rashes    Neuro: moving all four extremities , follows commands appropriately  Psych:  Good insight, oriented to person, place , alert, Nml Affect  LE: No edema    EKG: SR/Nml axis    LABS:        Lab Results   Component Value Date/Time    WBC 4.6 11/24/2020 02:19 PM    HGB 12.6 11/24/2020 02:19 PM    HCT 40.3 11/24/2020 02:19 PM    PLATELET 164 14/47/2723 02:19 PM     Lab Results   Component Value Date/Time    Sodium 140 11/24/2020 02:19 PM    Potassium 4.5 11/24/2020 02:19 PM    Chloride 108 11/24/2020 02:19 PM    CO2 28 11/24/2020 02:19 PM    Anion gap 4 (L) 11/24/2020 02:19 PM    Glucose 95 11/24/2020 02:19 PM    BUN 20 11/24/2020 02:19 PM    Creatinine 0.93 11/24/2020 02:19 PM    BUN/Creatinine ratio 22 (H) 11/24/2020 02:19 PM    GFR est AA >60 11/24/2020 02:19 PM    GFR est non-AA >60 11/24/2020 02:19 PM    Calcium 9.2 11/24/2020 02:19 PM       No results found for: APTT  No results found for: INR, PTMR, PTP, PT1, PT2, INREXT, INREXT  No components found for: LAST LIPIDS    ATTENTION:   This medical record was transcribed using an electronic medical records system. Although proofread, it may and can contain electronic and spelling and other errors. Corrections may be executed at a later time. Please feel free to contact us for any clarifications as needed.         Germaine Reardon MD

## 2021-10-26 ENCOUNTER — OFFICE VISIT (OUTPATIENT)
Dept: CARDIOLOGY CLINIC | Age: 51
End: 2021-10-26
Payer: COMMERCIAL

## 2021-10-26 VITALS
HEIGHT: 69 IN | SYSTOLIC BLOOD PRESSURE: 118 MMHG | WEIGHT: 230 LBS | OXYGEN SATURATION: 97 % | HEART RATE: 66 BPM | DIASTOLIC BLOOD PRESSURE: 74 MMHG | BODY MASS INDEX: 34.07 KG/M2

## 2021-10-26 DIAGNOSIS — I49.3 PVC'S (PREMATURE VENTRICULAR CONTRACTIONS): Primary | ICD-10-CM

## 2021-10-26 DIAGNOSIS — I48.0 PAF (PAROXYSMAL ATRIAL FIBRILLATION) (HCC): ICD-10-CM

## 2021-10-26 PROCEDURE — 93000 ELECTROCARDIOGRAM COMPLETE: CPT | Performed by: INTERNAL MEDICINE

## 2021-10-26 PROCEDURE — 99214 OFFICE O/P EST MOD 30 MIN: CPT | Performed by: INTERNAL MEDICINE

## 2021-10-26 NOTE — PROGRESS NOTES
Cecelia Medrano is a 45 y.o. female    Chief Complaint   Patient presents with    Follow-up     annual PVC's, PAF       Chest pain No    SOB slight SOB     Dizziness No    Swelling No    Refills No    Visit Vitals  /74 (BP 1 Location: Left upper arm, BP Patient Position: Sitting)   Pulse 66   Ht 5' 9\" (1.753 m)   Wt 230 lb (104.3 kg)   LMP 05/26/2015   SpO2 97%   BMI 33.97 kg/m²       1. Have you been to the ER, urgent care clinic since your last visit? Hospitalized since your last visit? No    2. Have you seen or consulted any other health care providers outside of the [address] [place] [address] since your last visit? Include any pap smears or colon screening.   No

## 2021-10-26 NOTE — LETTER
10/26/2021    Patient: Lawrence Rodrigez   YOB: 1970   Date of Visit: 10/26/2021     Juan Ramon Grande NP  [address] [id number]  [address] [employer]    Dear Juan Ramon Grande NP,      Thank you for referring Ms. Samantha Quesada to [facility] for evaluation. My notes for this consultation are attached. If you have questions, please do not hesitate to call me. I look forward to following your patient along with you.       Sincerely,    Tono Villagomez MD

## 2021-11-04 RX ORDER — LEVOTHYROXINE SODIUM 112 UG/1
TABLET ORAL
Qty: 90 TABLET | Refills: 1 | Status: SHIPPED | OUTPATIENT
Start: 2021-11-04 | End: 2022-05-23 | Stop reason: SDUPTHER

## 2021-11-08 ENCOUNTER — ANCILLARY PROCEDURE (OUTPATIENT)
Dept: CARDIOLOGY CLINIC | Age: 51
End: 2021-11-08
Payer: COMMERCIAL

## 2021-11-08 VITALS
DIASTOLIC BLOOD PRESSURE: 78 MMHG | HEIGHT: 69 IN | WEIGHT: 230 LBS | BODY MASS INDEX: 34.07 KG/M2 | SYSTOLIC BLOOD PRESSURE: 120 MMHG

## 2021-11-08 DIAGNOSIS — I48.0 PAF (PAROXYSMAL ATRIAL FIBRILLATION) (HCC): ICD-10-CM

## 2021-11-08 DIAGNOSIS — I49.3 PVC'S (PREMATURE VENTRICULAR CONTRACTIONS): ICD-10-CM

## 2021-11-08 PROCEDURE — 93306 TTE W/DOPPLER COMPLETE: CPT | Performed by: INTERNAL MEDICINE

## 2021-11-13 LAB
ECHO AO ASC DIAM: 2.8 CM
ECHO AO ROOT DIAM: 2.85 CM
ECHO AV AREA PEAK VELOCITY: 2.43 CM2
ECHO AV AREA VTI: 2.44 CM2
ECHO AV AREA/BSA PEAK VELOCITY: 1.1 CM2/M2
ECHO AV AREA/BSA VTI: 1.1 CM2/M2
ECHO AV MEAN GRADIENT: 4.98 MMHG
ECHO AV PEAK GRADIENT: 8.56 MMHG
ECHO AV PEAK VELOCITY: 146.26 CM/S
ECHO AV VTI: 32.91 CM
ECHO EST RA PRESSURE: 3 MMHG
ECHO LA AREA 4C: 19.36 CM2
ECHO LA MAJOR AXIS: 3.28 CM
ECHO LA MINOR AXIS: 1.5 CM
ECHO LA VOL 2C: 47.81 ML (ref 22–52)
ECHO LA VOL 4C: 52.91 ML (ref 22–52)
ECHO LA VOL BP: 55 ML (ref 22–52)
ECHO LA VOL/BSA BIPLANE: 25.11 ML/M2 (ref 16–28)
ECHO LA VOLUME INDEX A2C: 21.83 ML/M2 (ref 16–28)
ECHO LA VOLUME INDEX A4C: 24.16 ML/M2 (ref 16–28)
ECHO LV E' LATERAL VELOCITY: 11.87 CM/S
ECHO LV E' SEPTAL VELOCITY: 7.94 CM/S
ECHO LV EDV A2C: 71.3 ML
ECHO LV EDV A4C: 96.74 ML
ECHO LV EDV BP: 85.9 ML (ref 56–104)
ECHO LV EDV INDEX A4C: 44.2 ML/M2
ECHO LV EDV INDEX BP: 39.2 ML/M2
ECHO LV EDV NDEX A2C: 32.6 ML/M2
ECHO LV EJECTION FRACTION A2C: 62 PERCENT
ECHO LV EJECTION FRACTION A4C: 63 PERCENT
ECHO LV EJECTION FRACTION BIPLANE: 62.2 PERCENT (ref 55–100)
ECHO LV ESV A2C: 27.44 ML
ECHO LV ESV A4C: 35.67 ML
ECHO LV ESV BP: 32.43 ML (ref 19–49)
ECHO LV ESV INDEX A2C: 12.5 ML/M2
ECHO LV ESV INDEX A4C: 16.3 ML/M2
ECHO LV ESV INDEX BP: 14.8 ML/M2
ECHO LV INTERNAL DIMENSION DIASTOLIC: 4.06 CM (ref 3.9–5.3)
ECHO LV INTERNAL DIMENSION SYSTOLIC: 2.82 CM
ECHO LV IVSD: 1.17 CM (ref 0.6–0.9)
ECHO LV MASS 2D: 153 G (ref 67–162)
ECHO LV MASS INDEX 2D: 69.8 G/M2 (ref 43–95)
ECHO LV POSTERIOR WALL DIASTOLIC: 1.07 CM (ref 0.6–0.9)
ECHO LVOT DIAM: 1.91 CM
ECHO LVOT PEAK GRADIENT: 6.22 MMHG
ECHO LVOT PEAK VELOCITY: 124.71 CM/S
ECHO LVOT SV: 80.2 ML
ECHO LVOT VTI: 28.11 CM
ECHO MV A VELOCITY: 52.31 CM/S
ECHO MV AREA PHT: 3.3 CM2
ECHO MV E DECELERATION TIME (DT): 229.74 MS
ECHO MV E VELOCITY: 108.76 CM/S
ECHO MV E/A RATIO: 2.08
ECHO MV E/E' LATERAL: 9.16
ECHO MV E/E' RATIO (AVERAGED): 11.43
ECHO MV E/E' SEPTAL: 13.7
ECHO MV PRESSURE HALF TIME (PHT): 66.63 MS
ECHO RA AREA 4C: 11.71 CM2
ECHO RIGHT VENTRICULAR SYSTOLIC PRESSURE (RVSP): 19.97 MMHG
ECHO RV INTERNAL DIMENSION: 3.32 CM
ECHO RV TAPSE: 2.02 CM (ref 1.5–2)
ECHO TV REGURGITANT MAX VELOCITY: 205.97 CM/S
ECHO TV REGURGITANT PEAK GRADIENT: 16.97 MMHG
LA VOL DISK BP: 52.01 ML (ref 22–52)

## 2021-11-26 ENCOUNTER — TELEPHONE (OUTPATIENT)
Dept: CARDIOLOGY CLINIC | Age: 51
End: 2021-11-26

## 2021-11-26 NOTE — TELEPHONE ENCOUNTER
Patient notified of echocardiogram results: Echo-normal pump function/mild increased thickness of the heart muscle/ per Dr. Arabella Lopze. No concerns noted. Will follow up as scheduled or sooner PRN.     Future Appointments   Date Time Provider Jordan Sravanthi   10/27/2022  9:00 AM Khris Peralta MD [facility]

## 2021-11-28 RX ORDER — LEVOCETIRIZINE DIHYDROCHLORIDE 5 MG/1
TABLET, FILM COATED ORAL
Qty: 90 TABLET | Refills: 3 | Status: SHIPPED | OUTPATIENT
Start: 2021-11-28

## 2022-03-13 DIAGNOSIS — I48.19 PERSISTENT ATRIAL FIBRILLATION (HCC): ICD-10-CM

## 2022-03-13 RX ORDER — METOPROLOL SUCCINATE 50 MG/1
TABLET, EXTENDED RELEASE ORAL
Qty: 90 TABLET | Refills: 3 | Status: SHIPPED | OUTPATIENT
Start: 2022-03-13

## 2022-03-18 PROBLEM — I48.91 ATRIAL FIBRILLATION (HCC): Status: ACTIVE | Noted: 2018-03-12

## 2022-03-19 PROBLEM — Z90.710 HX OF HYSTERECTOMY: Status: ACTIVE | Noted: 2018-06-15

## 2022-05-23 RX ORDER — LEVOTHYROXINE SODIUM 112 UG/1
112 TABLET ORAL
Qty: 90 TABLET | Refills: 1 | Status: SHIPPED | OUTPATIENT
Start: 2022-05-23

## 2022-06-09 ENCOUNTER — OFFICE VISIT (OUTPATIENT)
Dept: FAMILY MEDICINE CLINIC | Age: 52
End: 2022-06-09
Payer: COMMERCIAL

## 2022-06-09 VITALS
TEMPERATURE: 98.3 F | SYSTOLIC BLOOD PRESSURE: 117 MMHG | BODY MASS INDEX: 34.21 KG/M2 | DIASTOLIC BLOOD PRESSURE: 78 MMHG | HEART RATE: 92 BPM | HEIGHT: 69 IN | RESPIRATION RATE: 18 BRPM | WEIGHT: 231 LBS | OXYGEN SATURATION: 98 %

## 2022-06-09 DIAGNOSIS — I48.0 PAF (PAROXYSMAL ATRIAL FIBRILLATION) (HCC): ICD-10-CM

## 2022-06-09 DIAGNOSIS — E03.1 CONGENITAL HYPOTHYROIDISM WITHOUT GOITER: ICD-10-CM

## 2022-06-09 DIAGNOSIS — Z00.00 WELL ADULT EXAM: Primary | ICD-10-CM

## 2022-06-09 PROCEDURE — 99396 PREV VISIT EST AGE 40-64: CPT | Performed by: NURSE PRACTITIONER

## 2022-06-09 RX ORDER — MUPIROCIN 20 MG/G
OINTMENT TOPICAL
Qty: 30 G | Refills: 1 | Status: SHIPPED | OUTPATIENT
Start: 2022-06-09

## 2022-06-09 NOTE — PROGRESS NOTES
Chief Complaint   Patient presents with    Labs     Pt being seen for thyroid labs  -pt wants to have her back looked at  -pt states it is an abscess that has been there since mikey    1. Have you been to the ER, urgent care clinic since your last visit? Hospitalized since your last visit? No    2. Have you seen or consulted any other health care providers outside of the [address] [place] [address] since your last visit? Include any pap smears or colon screening.  No     Pt has no other concerns

## 2022-06-10 LAB
ALBUMIN SERPL-MCNC: 3.9 G/DL (ref 3.5–5)
ALBUMIN/GLOB SERPL: 1.3 {RATIO} (ref 1.1–2.2)
ALP SERPL-CCNC: 87 U/L (ref 45–117)
ALT SERPL-CCNC: 14 U/L (ref 12–78)
ANION GAP SERPL CALC-SCNC: 7 MMOL/L (ref 5–15)
AST SERPL-CCNC: 17 U/L (ref 15–37)
BASOPHILS # BLD: 0.1 K/UL (ref 0–0.1)
BASOPHILS NFR BLD: 1 % (ref 0–1)
BILIRUB SERPL-MCNC: 0.2 MG/DL (ref 0.2–1)
BUN SERPL-MCNC: 22 MG/DL (ref 6–20)
BUN/CREAT SERPL: 21 (ref 12–20)
CALCIUM SERPL-MCNC: 9.3 MG/DL (ref 8.5–10.1)
CHLORIDE SERPL-SCNC: 107 MMOL/L (ref 97–108)
CHOLEST SERPL-MCNC: 190 MG/DL
CO2 SERPL-SCNC: 27 MMOL/L (ref 21–32)
CREAT SERPL-MCNC: 1.06 MG/DL (ref 0.55–1.02)
DIFFERENTIAL METHOD BLD: NORMAL
EOSINOPHIL # BLD: 0.2 K/UL (ref 0–0.4)
EOSINOPHIL NFR BLD: 3 % (ref 0–7)
ERYTHROCYTE [DISTWIDTH] IN BLOOD BY AUTOMATED COUNT: 12.8 % (ref 11.5–14.5)
GLOBULIN SER CALC-MCNC: 3 G/DL (ref 2–4)
GLUCOSE SERPL-MCNC: 97 MG/DL (ref 65–100)
HCT VFR BLD AUTO: 42.8 % (ref 35–47)
HDLC SERPL-MCNC: 41 MG/DL
HDLC SERPL: 4.6 {RATIO} (ref 0–5)
HGB BLD-MCNC: 13.2 G/DL (ref 11.5–16)
IMM GRANULOCYTES # BLD AUTO: 0 K/UL (ref 0–0.04)
IMM GRANULOCYTES NFR BLD AUTO: 0 % (ref 0–0.5)
LDLC SERPL CALC-MCNC: 104 MG/DL (ref 0–100)
LYMPHOCYTES # BLD: 1.8 K/UL (ref 0.8–3.5)
LYMPHOCYTES NFR BLD: 32 % (ref 12–49)
MCH RBC QN AUTO: 29.3 PG (ref 26–34)
MCHC RBC AUTO-ENTMCNC: 30.8 G/DL (ref 30–36.5)
MCV RBC AUTO: 94.9 FL (ref 80–99)
MONOCYTES # BLD: 0.4 K/UL (ref 0–1)
MONOCYTES NFR BLD: 7 % (ref 5–13)
NEUTS SEG # BLD: 3.2 K/UL (ref 1.8–8)
NEUTS SEG NFR BLD: 57 % (ref 32–75)
NRBC # BLD: 0 K/UL (ref 0–0.01)
NRBC BLD-RTO: 0 PER 100 WBC
PLATELET # BLD AUTO: 300 K/UL (ref 150–400)
PMV BLD AUTO: 9.9 FL (ref 8.9–12.9)
POTASSIUM SERPL-SCNC: 4.6 MMOL/L (ref 3.5–5.1)
PROT SERPL-MCNC: 6.9 G/DL (ref 6.4–8.2)
RBC # BLD AUTO: 4.51 M/UL (ref 3.8–5.2)
SODIUM SERPL-SCNC: 141 MMOL/L (ref 136–145)
TRIGL SERPL-MCNC: 225 MG/DL (ref ?–150)
TSH SERPL DL<=0.05 MIU/L-ACNC: 1.31 UIU/ML (ref 0.36–3.74)
VLDLC SERPL CALC-MCNC: 45 MG/DL
WBC # BLD AUTO: 5.6 K/UL (ref 3.6–11)

## 2022-06-14 NOTE — PROGRESS NOTES
Subjective:   45 y.o. female for Well Woman Check. Her gyne and breast care is done elsewhere by her Ob-Gyne physician. Patient Active Problem List    Diagnosis Date Noted    Hx of hysterectomy 06/15/2018    Atrial fibrillation ([facility] [address] 2018    Menorrhagia 2015    Endometriosis 2015    Hypothyroid 2010    Anxiety 2010    Migraine 2010     Current Outpatient Medications   Medication Sig Dispense Refill    mupirocin (BACTROBAN) 2 % ointment Apply  to affected area two (2) times daily as needed (skin infection). 30 g 1    levothyroxine (SYNTHROID) 112 mcg tablet Take 1 Tablet by mouth Daily (before breakfast). 90 Tablet 1    metoprolol succinate (TOPROL-XL) 50 mg XL tablet TAKE 1 TABLET BY MOUTH EVERY DAY 90 Tablet 3    levocetirizine (XYZAL) 5 mg tablet TAKE 1 TABLET BY MOUTH EVERY DAY 90 Tablet 3    DivigeL 1 mg/gram (0.1 %) glpk 1 Packet by TransDERmal route daily. 90 Packet 4    aspirin delayed-release 81 mg tablet Take  by mouth daily.  potassium (POTASSIMIN PO) Take  by mouth daily.  horse chestnut seed (HORSE CHESTNUT PO) Take  by mouth. Family History   Problem Relation Age of Onset    Hypertension Mother     Migraines Mother     Kidney Disease Father      Social History     Tobacco Use    Smoking status: Former Smoker     Packs/day: 0.50     Years: 33.00     Pack years: 16.50     Quit date: 3/28/2017     Years since quittin.2    Smokeless tobacco: Never Used   Substance Use Topics    Alcohol use: Yes     Comment: occ             ROS: Feeling generally well. No TIA's or unusual headaches, no dysphagia. No prolonged cough. No dyspnea or chest pain on exertion. No abdominal pain, change in bowel habits, black or bloody stools. No urinary tract symptoms. No new or unusual musculoskeletal symptoms. Specific concerns today: none. Objective: The patient appears well, alert, oriented x 3, in no distress.   Visit Vitals  /78 (BP 1 Location: Left upper arm, BP Patient Position: Sitting)   Pulse 92   Temp 98.3 °F (36.8 °C) (Oral)   Resp 18   Ht 5' 9\" (1.753 m)   Wt 231 lb (104.8 kg)   LMP 05/26/2015   SpO2 98%   BMI 34.11 kg/m²     ENT normal.  Neck supple. No adenopathy or thyromegaly. JENNIFER. Lungs are clear, good air entry, no wheezes, rhonchi or rales. S1 and S2 normal, no murmurs, regular rate and rhythm. Abdomen soft without tenderness, guarding, mass or organomegaly. Extremities show no edema, normal peripheral pulses. Neurological is normal, no focal findings. Breast and Pelvic exams are deferred. Assessment/Plan:   Well Woman  lose weight, increase physical activity, follow low fat diet, follow low salt diet, routine labs ordered  Encounter Diagnoses   Name Primary?  Well adult exam Yes    Congenital hypothyroidism without goiter     PAF (paroxysmal atrial fibrillation) ([facility] [address]      Orders Placed This Encounter    CBC WITH AUTOMATED DIFF    METABOLIC PANEL, COMPREHENSIVE    TSH 3RD GENERATION    LIPID PANEL    mupirocin (BACTROBAN) 2 % ointment     I have discussed the diagnosis with the patient and the intended plan as seen in the above orders. The patient has received an after-visit summary and questions were answered concerning future plans. Patient conveyed understanding of the plan at the time of the visit.     María Bell, MSN, ANP  6/14/2022

## 2022-10-31 ENCOUNTER — OFFICE VISIT (OUTPATIENT)
Dept: CARDIOLOGY CLINIC | Age: 52
End: 2022-10-31
Payer: COMMERCIAL

## 2022-10-31 VITALS
BODY MASS INDEX: 34.66 KG/M2 | HEART RATE: 62 BPM | DIASTOLIC BLOOD PRESSURE: 74 MMHG | HEIGHT: 69 IN | WEIGHT: 234 LBS | SYSTOLIC BLOOD PRESSURE: 130 MMHG | OXYGEN SATURATION: 97 %

## 2022-10-31 DIAGNOSIS — E66.01 SEVERE OBESITY (HCC): ICD-10-CM

## 2022-10-31 DIAGNOSIS — I49.3 PVC'S (PREMATURE VENTRICULAR CONTRACTIONS): ICD-10-CM

## 2022-10-31 DIAGNOSIS — I48.0 PAF (PAROXYSMAL ATRIAL FIBRILLATION) (HCC): Primary | ICD-10-CM

## 2022-10-31 PROCEDURE — 93000 ELECTROCARDIOGRAM COMPLETE: CPT | Performed by: INTERNAL MEDICINE

## 2022-10-31 PROCEDURE — 99214 OFFICE O/P EST MOD 30 MIN: CPT | Performed by: INTERNAL MEDICINE

## 2022-10-31 NOTE — LETTER
10/31/2022    Patient: Fausto Weber   YOB: 1970   Date of Visit: 10/31/2022     María Rea NP  [address] [id number]  Via In Basket    Dear María Rea NP,      Thank you for referring Ms. Samantha Quesada to [facility] for evaluation. My notes for this consultation are attached. If you have questions, please do not hesitate to call me. I look forward to following your patient along with you.       Sincerely,    Kerry Moore MD

## 2022-10-31 NOTE — PROGRESS NOTES
Sharan Lima is a 47 y.o. female    Chief Complaint   Patient presents with    Follow-up     Annual PVC's, PAF     Patient states some palpitations     Chest pain no    SOB with the palpitations    Dizziness no    Swelling no    Refills no    Visit Vitals  /74 (BP 1 Location: Left upper arm, BP Patient Position: Sitting)   Pulse 62   Ht 5' 9\" (1.753 m)   Wt 234 lb (106.1 kg)   LMP 05/26/2015   SpO2 97%   BMI 34.56 kg/m²       1. Have you been to the ER, urgent care clinic since your last visit? Hospitalized since your last visit? No    2. Have you seen or consulted any other health care providers outside of the [address] [place] [address] since your last visit? Include any pap smears or colon screening.  No

## 2022-10-31 NOTE — PROGRESS NOTES
Kerry Moore MD    [id number] [facility] [place], [address] [facility]    Office [phone],JBL [phone]      Fausto Weber is a 47 y.o. female is here for f/u visit. Primary care physician:  Ron Hale NP        Dear Ms Miley Yost,    I had the pleasure of seeing  Ms. Fausto Weber in the office today. Chief complaint:  Chief Complaint   Patient presents with    Follow-up     Annual PVC's, PAF       Assessment:    Hx of PAF ( Atrial fibrillation-diagnosed on 3/12/18-incidental finding during routine office visit.). Today in sinus rhythm  PVC  Anxiety  Hypothyroidism - controlled per pt      Plan: On ASA/metoprolol. Have discussed before and has not wanted to be on anticoagulation  Monitor blood pressure. Patient has a blood pressure monitor at home.  6/9/22  Follow-up 12 months/earlier as needed  (Saw ANP 6/2018 in f/u - Sleep study referral made but patient has not yet had a sleep study. Does not want to get it done. Previous visit-has not wanted to be on anticoagulation. On aspirin. Holter monitor previously did not show recurrence of A. fib.  )  Aggressive cardiovascular risk factor modification. Advised weight loss. Patient understands the plan. All questions were answered to the patient's satisfaction. Medication Side Effects and Warnings were discussed with patient: yes  Patient Labs were reviewed and or requested:  yes  Patient Past Records were reviewed and or requested: yes    I appreciate the opportunity to be involved in Ms. Vincent. See note below for details. Please do not hesitate to contact us with questions or concerns. Kerry Moore MD    Cardiac Testing/ Procedures: A. Cardiac Cath/PCI:    B.ECHO/MICHELLE:     Echo 4/11/18 - EF 60%. No WMA. Normal atrial dimensions. C.StressNuclear/Stress ECHO/Stress test: Exercise Cardiolite 4/11/18 - 5:00. Normal perfusion. EF 76%.     D.Vascular:    E. EP: 6/2018 Holter - 24 Hr Holter monitor 6/18/18 - SR, rates 53 to 132. 31 PAC's, 1 episode of atrial tachycardia, frequent PVC's. Diary entry correlates with SR with PVC's - rates recorded 72 to 80 bpm.    F. Miscellaneous:    Subjective:  Edward Davis is a 47 y.o. female who returns for follow up visit    Patient is doing well. No chest pain. No palpitations. .  No swelling lower extremities. Has gained weight. ROS:  (bold if positive, if negative)           Medications before admission:    Current Outpatient Medications   Medication Sig Dispense    mupirocin (BACTROBAN) 2 % ointment Apply  to affected area two (2) times daily as needed (skin infection). 30 g    levothyroxine (SYNTHROID) 112 mcg tablet Take 1 Tablet by mouth Daily (before breakfast). 90 Tablet    metoprolol succinate (TOPROL-XL) 50 mg XL tablet TAKE 1 TABLET BY MOUTH EVERY DAY 90 Tablet    levocetirizine (XYZAL) 5 mg tablet TAKE 1 TABLET BY MOUTH EVERY DAY 90 Tablet    DivigeL 1 mg/gram (0.1 %) glpk 1 Packet by TransDERmal route daily. 90 Packet    aspirin delayed-release 81 mg tablet Take  by mouth daily. potassium (POTASSIMIN PO) Take  by mouth daily. horse chestnut seed (HORSE CHESTNUT PO) Take  by mouth. No current facility-administered medications for this visit. Family History of CAD:    No    Social History:  Current  Smoker No    Physical Exam:  Visit Vitals  /74 (BP 1 Location: Left upper arm, BP Patient Position: Sitting)   Pulse 62   Ht 5' 9\" (1.753 m)   Wt 234 lb (106.1 kg)   LMP 05/26/2015   SpO2 97%   BMI 34.56 kg/m²          Gen: Well-developed, well-nourished, in no acute distress  Neck: Supple,No JVD, No Carotid Bruit,   Resp: No accessory muscle use, Clear breath sounds, No rales or rhonchi  Card: Irregular Rate,Rythm,Normal S1, S2, No murmurs, rubs or gallop. No thrills.    Abd:  Soft, BS+,   MSK: No cyanosis  Skin: No rashes    Neuro: moving all four extremities , follows commands appropriately  Psych:  Good insight, oriented to person, place , alert, Nml Affect  LE: No edema    EKG: Sinus bradycardia, nonspecific ST-T changes    LABS:        Lab Results   Component Value Date/Time    WBC 5.6 06/09/2022 03:57 PM    HGB 13.2 06/09/2022 03:57 PM    HCT 42.8 06/09/2022 03:57 PM    PLATELET 232 50/28/2682 03:57 PM     Lab Results   Component Value Date/Time    Sodium 141 06/09/2022 03:57 PM    Potassium 4.6 06/09/2022 03:57 PM    Chloride 107 06/09/2022 03:57 PM    CO2 27 06/09/2022 03:57 PM    Anion gap 7 06/09/2022 03:57 PM    Glucose 97 06/09/2022 03:57 PM    BUN 22 (H) 06/09/2022 03:57 PM    Creatinine 1.06 (H) 06/09/2022 03:57 PM    BUN/Creatinine ratio 21 (H) 06/09/2022 03:57 PM    GFR est AA >60 06/09/2022 03:57 PM    GFR est non-AA 54 (L) 06/09/2022 03:57 PM    Calcium 9.3 06/09/2022 03:57 PM       No results found for: APTT  No results found for: INR, PTMR, PTP, PT1, PT2, INREXT, INREXT  No components found for: LAST LIPIDS    ATTENTION:   This medical record was transcribed using an electronic medical records system. Although proofread, it may and can contain electronic and spelling and other errors. Corrections may be executed at a later time. Please feel free to contact us for any clarifications as needed.         Pawel Galaviz MD

## 2022-11-16 NOTE — PROGRESS NOTES
Carine Alexandre is a 47 y.o. female returns for an annual exam     Chief Complaint   Patient presents with    Well Woman       Patient's last menstrual period was 05/26/2015. Hysterectomy    Problems: no significant problems    Birth Control: status post hysterectomy. Last Pap: 05/09/2018 normal/HPV not done    Last Mammogram:  12/07/2020 . It was normal. Scheduled for  mammogram on 01/18/2023        1. Have you been to the ER, urgent care clinic, or hospitalized since your last visit? No    2. Have you seen or consulted any other health care providers outside of the [address] [place] [address] since your last visit?  No    Examination chaperoned by Dinesh Munoz

## 2022-11-17 RX ORDER — LEVOTHYROXINE SODIUM 112 UG/1
TABLET ORAL
Qty: 90 TABLET | Refills: 1 | Status: SHIPPED | OUTPATIENT
Start: 2022-11-17

## 2022-11-21 NOTE — PROGRESS NOTES
Skippy Apgar is a ,  47 y.o. female WHITE/NON- whose LMP was on  who presents for her annual checkup. She is having no significant problems. Menstrual status:    Patient has had a hysterectomy  Pt is postmenopausal    The patient is using HRT. Divigel    Contraception:    The current method of family planning is status post hysterectomy. Sexual history:    She  reports being sexually active and has had partner(s) who are male. She reports using the following method of birth control/protection: Surgical.        Pap and Mammogram History:    Last Pap: 2018 normal/HPV not done     Last Mammogram:  2020 . It was normal. Scheduled for  mammogram on 2023    Breast Cancer History    She has a family history of breast cancer. MGM    Past Medical History:   Diagnosis Date    Anxiety 2010    Atrial fibrillation ([facility] [address] 2018    Hypothyroid 2010    Ill-defined condition     depression    Migraine 2010    Psychotic disorder [facility])      Past Surgical History:   Procedure Laterality Date    HX HYSTERECTOMY  2015    Prashanti LTH/BSO    HX LEFT SALPINGECTOMY      ectopic    HX ORTHOPAEDIC Right     heel and wrist    HX TUBAL LIGATION      AL ABDOMEN SURGERY PROC UNLISTED      laparotomy with appendectomy     Current Outpatient Medications   Medication Sig Dispense Refill    DivigeL 1 mg/gram (0.1 %) glpk 1 Packet by TransDERmal route daily. 90 Packet 4    levothyroxine (SYNTHROID) 112 mcg tablet TAKE 1 TABLET BY MOUTH EVERY DAY BEFORE BREAKFAST 90 Tablet 1    mupirocin (BACTROBAN) 2 % ointment Apply  to affected area two (2) times daily as needed (skin infection). 30 g 1    metoprolol succinate (TOPROL-XL) 50 mg XL tablet TAKE 1 TABLET BY MOUTH EVERY DAY 90 Tablet 3    levocetirizine (XYZAL) 5 mg tablet TAKE 1 TABLET BY MOUTH EVERY DAY 90 Tablet 3    aspirin delayed-release 81 mg tablet Take  by mouth daily. potassium (POTASSIMIN PO) Take  by mouth daily. horse chestnut seed (HORSE CHESTNUT PO) Take  by mouth. Allergies: Doxycycline and Pcn [penicillins]   Social History     Socioeconomic History    Marital status:      Spouse name: Not on file    Number of children: Not on file    Years of education: Not on file    Highest education level: Not on file   Occupational History    Not on file   Tobacco Use    Smoking status: Former     Packs/day: 0.50     Years: 33.00     Pack years: 16.50     Types: Cigarettes     Quit date: 3/28/2017     Years since quittin.6    Smokeless tobacco: Never   Substance and Sexual Activity    Alcohol use: Yes     Comment: occ    Drug use: No    Sexual activity: Yes     Partners: Male     Birth control/protection: Surgical   Other Topics Concern    Not on file   Social History Narrative    Not on file     Social Determinants of Health     Financial Resource Strain: Not on file   Food Insecurity: Not on file   Transportation Needs: Not on file   Physical Activity: Not on file   Stress: Not on file   Social Connections: Not on file   Intimate Partner Violence: Not on file   Housing Stability: Not on file     Tobacco History:  reports that she quit smoking about 5 years ago. Her smoking use included cigarettes. She has a 16.50 pack-year smoking history. She has never used smokeless tobacco.  Alcohol Abuse:  reports current alcohol use. Drug Abuse:  reports no history of drug use.   Patient Active Problem List   Diagnosis Code    Anxiety F41.9    Migraine G43.909    Hypothyroid E03.9    Menorrhagia N92.0    Endometriosis N80.9    Atrial fibrillation (HCC) I48.91    Hx of hysterectomy Z90.710         Review of Systems - History obtained from the patient  Constitutional: negative for weight loss, fever, night sweats  HEENT: negative for hearing loss, earache, congestion, snoring, sorethroat  CV: negative for chest pain, palpitations, edema  Resp: negative for cough, shortness of breath, wheezing  GI: negative for change in bowel habits, abdominal pain, black or bloody stools  : negative for frequency, dysuria, hematuria, vaginal discharge  MSK: negative for back pain, joint pain, muscle pain  Breast: negative for breast lumps, nipple discharge, galactorrhea  Skin :negative for itching, rash, hives  Neuro: negative for dizziness, headache, confusion, weakness  Psych: negative for anxiety, depression, change in mood  Heme/lymph: negative for bleeding, bruising, pallor    Physical Exam    Visit Vitals  /84   Wt 232 lb (105.2 kg)   LMP 05/26/2015   BMI 34.26 kg/m²     Constitutional  Appearance: well-nourished, well developed, alert, in no acute distress    HENT  Head and Face: appears normal    Neck  Inspection/Palpation: normal appearance, no masses or tenderness  Lymph Nodes: no lymphadenopathy present  Thyroid: gland size normal, nontender, no nodules or masses present on palpation    Chest  Respiratory Effort: breathing normal  Auscultation: normal breath sounds    Cardiovascular  Heart:   Auscultation: regular rate and rhythm without murmur    Breasts  Inspection of Breasts: breasts symmetrical, no skin changes, no discharge present, nipple appearance normal, no skin retraction present  Palpation of Breasts and Axillae: no masses present on palpation, no breast tenderness  Axillary Lymph Nodes: no lymphadenopathy present    Gastrointestinal  Abdominal Examination: abdomen non-tender to palpation, normal bowel sounds, no masses present  Liver and spleen: no hepatomegaly present, spleen not palpable  Hernias: no hernias identified    Skin  General Inspection: no rash, no lesions identified    Neurologic/Psychiatric  Mental Status:  Orientation: grossly oriented to person, place and time  Mood and Affect: mood normal, affect appropriate    Genitourinary  External Genitalia: normal appearance for age, no discharge present, no tenderness present, no inflammatory lesions present, no masses present, no atrophy present  Vagina: normal vaginal vault without central or paravaginal defects, no discharge present, no inflammatory lesions present, no masses present  Bladder: non-tender to palpation  Urethra: appears normal  Cervix: Absent  Uterus: Absent   adnexa: no adnexal tenderness present, no adnexal masses present  Perineum: perineum within normal limits, no evidence of trauma, no rashes or skin lesions present  Anus: anus within normal limits, no hemorrhoids present  Inguinal Lymph Nodes: no lymphadenopathy present    Assessment:  Routine gynecologic examination  Her current medical status is satisfactory with no evidence of significant gynecologic issues.     Plan:  Counseled re: diet, exercise, healthy lifestyle  Return for yearly wellness visits  Rec annual mammogram  Pap/HPV  Continue Divigel  Discussed and recommended colonoscopy

## 2022-11-22 ENCOUNTER — OFFICE VISIT (OUTPATIENT)
Dept: OBGYN CLINIC | Age: 52
End: 2022-11-22
Payer: COMMERCIAL

## 2022-11-22 VITALS — WEIGHT: 232 LBS | SYSTOLIC BLOOD PRESSURE: 115 MMHG | DIASTOLIC BLOOD PRESSURE: 84 MMHG | BODY MASS INDEX: 34.26 KG/M2

## 2022-11-22 DIAGNOSIS — Z01.419 ENCOUNTER FOR GYNECOLOGICAL EXAMINATION (GENERAL) (ROUTINE) WITHOUT ABNORMAL FINDINGS: Primary | ICD-10-CM

## 2022-11-22 DIAGNOSIS — Z79.890 HORMONE REPLACEMENT THERAPY: ICD-10-CM

## 2022-11-22 PROCEDURE — 99396 PREV VISIT EST AGE 40-64: CPT | Performed by: OBSTETRICS & GYNECOLOGY

## 2022-11-22 RX ORDER — ESTRADIOL 1 MG/G
1 GEL TOPICAL DAILY
Qty: 90 PACKET | Refills: 4 | Status: SHIPPED | OUTPATIENT
Start: 2022-11-22

## 2022-11-26 LAB
CYTOLOGIST CVX/VAG CYTO: NORMAL
CYTOLOGY CVX/VAG DOC CYTO: NORMAL
CYTOLOGY CVX/VAG DOC THIN PREP: NORMAL
CYTOLOGY HISTORY:: NORMAL
DX ICD CODE: NORMAL
HPV GENOTYPE REFLEX: NORMAL
HPV I/H RISK 4 DNA CVX QL PROBE+SIG AMP: NEGATIVE
Lab: NORMAL
OTHER STN SPEC: NORMAL
STAT OF ADQ CVX/VAG CYTO-IMP: NORMAL

## 2022-12-04 RX ORDER — LEVOCETIRIZINE DIHYDROCHLORIDE 5 MG/1
TABLET, FILM COATED ORAL
Qty: 90 TABLET | Refills: 3 | Status: SHIPPED | OUTPATIENT
Start: 2022-12-04

## 2023-01-04 RX ORDER — LEVOCETIRIZINE DIHYDROCHLORIDE 5 MG/1
5 TABLET, FILM COATED ORAL DAILY
Qty: 90 TABLET | Refills: 3 | Status: SHIPPED | OUTPATIENT
Start: 2023-01-04

## 2023-05-26 RX ORDER — LEVOTHYROXINE SODIUM 112 UG/1
TABLET ORAL
Qty: 90 TABLET | Refills: 1 | Status: SHIPPED | OUTPATIENT
Start: 2023-05-26

## 2023-11-14 ENCOUNTER — OFFICE VISIT (OUTPATIENT)
Age: 53
End: 2023-11-14
Payer: COMMERCIAL

## 2023-11-14 VITALS
SYSTOLIC BLOOD PRESSURE: 118 MMHG | OXYGEN SATURATION: 97 % | WEIGHT: 214.6 LBS | HEART RATE: 52 BPM | DIASTOLIC BLOOD PRESSURE: 64 MMHG | BODY MASS INDEX: 31.78 KG/M2 | HEIGHT: 69 IN

## 2023-11-14 DIAGNOSIS — I49.3 VENTRICULAR PREMATURE DEPOLARIZATION: ICD-10-CM

## 2023-11-14 DIAGNOSIS — I48.0 PAROXYSMAL ATRIAL FIBRILLATION (HCC): Primary | ICD-10-CM

## 2023-11-14 PROCEDURE — 93000 ELECTROCARDIOGRAM COMPLETE: CPT | Performed by: INTERNAL MEDICINE

## 2023-11-14 PROCEDURE — 99213 OFFICE O/P EST LOW 20 MIN: CPT | Performed by: INTERNAL MEDICINE

## 2023-11-24 RX ORDER — LEVOTHYROXINE SODIUM 112 UG/1
TABLET ORAL
Qty: 90 TABLET | Refills: 0 | Status: SHIPPED | OUTPATIENT
Start: 2023-11-24

## 2024-01-08 SDOH — ECONOMIC STABILITY: TRANSPORTATION INSECURITY
IN THE PAST 12 MONTHS, HAS LACK OF TRANSPORTATION KEPT YOU FROM MEETINGS, WORK, OR FROM GETTING THINGS NEEDED FOR DAILY LIVING?: NO

## 2024-01-08 SDOH — ECONOMIC STABILITY: FOOD INSECURITY: WITHIN THE PAST 12 MONTHS, YOU WORRIED THAT YOUR FOOD WOULD RUN OUT BEFORE YOU GOT MONEY TO BUY MORE.: NEVER TRUE

## 2024-01-08 SDOH — ECONOMIC STABILITY: HOUSING INSECURITY
IN THE LAST 12 MONTHS, WAS THERE A TIME WHEN YOU DID NOT HAVE A STEADY PLACE TO SLEEP OR SLEPT IN A SHELTER (INCLUDING NOW)?: NO

## 2024-01-08 SDOH — ECONOMIC STABILITY: INCOME INSECURITY: HOW HARD IS IT FOR YOU TO PAY FOR THE VERY BASICS LIKE FOOD, HOUSING, MEDICAL CARE, AND HEATING?: NOT VERY HARD

## 2024-01-08 SDOH — ECONOMIC STABILITY: FOOD INSECURITY: WITHIN THE PAST 12 MONTHS, THE FOOD YOU BOUGHT JUST DIDN'T LAST AND YOU DIDN'T HAVE MONEY TO GET MORE.: NEVER TRUE

## 2024-01-09 ENCOUNTER — OFFICE VISIT (OUTPATIENT)
Age: 54
End: 2024-01-09
Payer: COMMERCIAL

## 2024-01-09 VITALS
TEMPERATURE: 97.9 F | RESPIRATION RATE: 18 BRPM | HEART RATE: 55 BPM | SYSTOLIC BLOOD PRESSURE: 119 MMHG | BODY MASS INDEX: 31.4 KG/M2 | HEIGHT: 69 IN | DIASTOLIC BLOOD PRESSURE: 71 MMHG | WEIGHT: 212 LBS | OXYGEN SATURATION: 97 %

## 2024-01-09 DIAGNOSIS — M25.561 CHRONIC PAIN OF RIGHT KNEE: ICD-10-CM

## 2024-01-09 DIAGNOSIS — I48.0 PAROXYSMAL ATRIAL FIBRILLATION (HCC): ICD-10-CM

## 2024-01-09 DIAGNOSIS — Z00.00 WELL EXAM WITHOUT ABNORMAL FINDINGS OF PATIENT 18 YEARS OF AGE OR OLDER: Primary | ICD-10-CM

## 2024-01-09 DIAGNOSIS — E03.1 CONGENITAL HYPOTHYROIDISM WITHOUT GOITER: ICD-10-CM

## 2024-01-09 DIAGNOSIS — G89.29 CHRONIC PAIN OF RIGHT KNEE: ICD-10-CM

## 2024-01-09 PROCEDURE — 99396 PREV VISIT EST AGE 40-64: CPT | Performed by: NURSE PRACTITIONER

## 2024-01-09 RX ORDER — TRAZODONE HYDROCHLORIDE 100 MG/1
100 TABLET ORAL NIGHTLY
Qty: 90 TABLET | Refills: 1 | Status: SHIPPED | OUTPATIENT
Start: 2024-01-09

## 2024-01-09 RX ORDER — DICLOFENAC SODIUM 75 MG/1
75 TABLET, DELAYED RELEASE ORAL 2 TIMES DAILY
Qty: 60 TABLET | Refills: 3 | Status: SHIPPED | OUTPATIENT
Start: 2024-01-09

## 2024-01-09 ASSESSMENT — PATIENT HEALTH QUESTIONNAIRE - PHQ9
SUM OF ALL RESPONSES TO PHQ QUESTIONS 1-9: 0
SUM OF ALL RESPONSES TO PHQ9 QUESTIONS 1 & 2: 0
SUM OF ALL RESPONSES TO PHQ QUESTIONS 1-9: 0
1. LITTLE INTEREST OR PLEASURE IN DOING THINGS: 0
2. FEELING DOWN, DEPRESSED OR HOPELESS: 0

## 2024-01-09 NOTE — PROGRESS NOTES
2024    Gilma Miller (:  1970) is a 53 y.o. female, here for a preventive medicine evaluation.    Patient Active Problem List   Diagnosis    Endometriosis    Atrial fibrillation (HCC)    Menorrhagia    Hypothyroid    Hx of hysterectomy    Migraine    Anxiety   She is also here for follow up fasting labs  Managed for thyroid dx and a fib  No complaints    Right knee pain x 2-3 weeks. Using ice and heat. Occurred jumping rope at [employer]    Patient can not sleep. She has tried melatonin and advil pm.      Review of Systems  A comprehensive review of system was obtained and negative except findings in the HPI      Prior to Visit Medications    Medication Sig Taking? Authorizing Provider   diclofenac (VOLTAREN) 75 MG EC tablet Take 1 tablet by mouth 2 times daily Yes Lia Mckeon APRN - NP   traZODone (DESYREL) 100 MG tablet Take 1 tablet by mouth nightly Yes Lia Mckeon APRN - NP   levothyroxine (SYNTHROID) 112 MCG tablet TAKE 1 TABLET BY MOUTH EVERY DAY BEFORE BREAKFAST Yes Livier Richards, CAROLYN   aspirin 81 MG EC tablet Take by mouth daily Yes Automatic Reconciliation, Ar   Estradiol (DIVIGEL) 1 MG/GM GEL Place 1 packet onto the skin daily Yes Automatic Reconciliation, Ar   levocetirizine (XYZAL) 5 MG tablet Take 1 tablet by mouth daily Yes Automatic Reconciliation, Ar   metoprolol succinate (TOPROL XL) 50 MG extended release tablet Take 1 tablet by mouth daily Yes Automatic Reconciliation, Ar   mupirocin (BACTROBAN) 2 % ointment Apply topically 2 times daily as needed Yes Automatic Reconciliation, Ar        Allergies   Allergen Reactions    Penicillins Hives    Doxycycline Hives and Rash       Past Medical History:   Diagnosis Date    Anxiety 2010    Atrial fibrillation (HCC) 2018    Breast cancer screening by mammogram 2023    Normal    Hypothyroid 2010    Ill-defined condition     depression    Migraine 2010    Psychotic disorder (HCC)

## 2024-01-09 NOTE — PROGRESS NOTES
Chief Complaint   Patient presents with    6 Month Follow-Up   Right knee pain x 2-3 weeks. Using ice and heat.   Patient can not sleep. She has tried melatonin and advil pm.     1. Have you been to the ER, urgent care clinic since your last visit?  Hospitalized since your last visit?No    2. Have you seen or consulted any other health care providers outside of the [facility] since your last visit?  Include any pap smears or colon screening. No

## 2024-01-10 DIAGNOSIS — I48.0 PAROXYSMAL ATRIAL FIBRILLATION (HCC): ICD-10-CM

## 2024-01-10 DIAGNOSIS — E03.1 CONGENITAL HYPOTHYROIDISM WITHOUT GOITER: ICD-10-CM

## 2024-01-10 DIAGNOSIS — Z00.00 WELL EXAM WITHOUT ABNORMAL FINDINGS OF PATIENT 18 YEARS OF AGE OR OLDER: ICD-10-CM

## 2024-01-10 LAB
ALBUMIN SERPL-MCNC: 4 G/DL (ref 3.5–5)
ALBUMIN/GLOB SERPL: 1.3 (ref 1.1–2.2)
ALP SERPL-CCNC: 88 U/L (ref 45–117)
ALT SERPL-CCNC: 12 U/L (ref 12–78)
ANION GAP SERPL CALC-SCNC: 3 MMOL/L (ref 5–15)
AST SERPL-CCNC: 16 U/L (ref 15–37)
BASOPHILS # BLD: 0 K/UL (ref 0–0.1)
BASOPHILS NFR BLD: 1 % (ref 0–1)
BILIRUB SERPL-MCNC: 0.3 MG/DL (ref 0.2–1)
BUN SERPL-MCNC: 23 MG/DL (ref 6–20)
BUN/CREAT SERPL: 23 (ref 12–20)
CALCIUM SERPL-MCNC: 9 MG/DL (ref 8.5–10.1)
CHLORIDE SERPL-SCNC: 109 MMOL/L (ref 97–108)
CHOLEST SERPL-MCNC: 189 MG/DL
CO2 SERPL-SCNC: 30 MMOL/L (ref 21–32)
CREAT SERPL-MCNC: 1.01 MG/DL (ref 0.55–1.02)
DIFFERENTIAL METHOD BLD: NORMAL
EOSINOPHIL # BLD: 0.3 K/UL (ref 0–0.4)
EOSINOPHIL NFR BLD: 6 % (ref 0–7)
ERYTHROCYTE [DISTWIDTH] IN BLOOD BY AUTOMATED COUNT: 12.9 % (ref 11.5–14.5)
GLOBULIN SER CALC-MCNC: 3 G/DL (ref 2–4)
GLUCOSE SERPL-MCNC: 95 MG/DL (ref 65–100)
HCT VFR BLD AUTO: 39.3 % (ref 35–47)
HDLC SERPL-MCNC: 41 MG/DL
HDLC SERPL: 4.6 (ref 0–5)
HGB BLD-MCNC: 12.6 G/DL (ref 11.5–16)
IMM GRANULOCYTES # BLD AUTO: 0 K/UL (ref 0–0.04)
IMM GRANULOCYTES NFR BLD AUTO: 0 % (ref 0–0.5)
LDLC SERPL CALC-MCNC: 112.2 MG/DL (ref 0–100)
LYMPHOCYTES # BLD: 1.5 K/UL (ref 0.8–3.5)
LYMPHOCYTES NFR BLD: 31 % (ref 12–49)
MCH RBC QN AUTO: 29.5 PG (ref 26–34)
MCHC RBC AUTO-ENTMCNC: 32.1 G/DL (ref 30–36.5)
MCV RBC AUTO: 92 FL (ref 80–99)
MONOCYTES # BLD: 0.3 K/UL (ref 0–1)
MONOCYTES NFR BLD: 6 % (ref 5–13)
NEUTS SEG # BLD: 2.8 K/UL (ref 1.8–8)
NEUTS SEG NFR BLD: 56 % (ref 32–75)
NRBC # BLD: 0 K/UL (ref 0–0.01)
NRBC BLD-RTO: 0 PER 100 WBC
PLATELET # BLD AUTO: 283 K/UL (ref 150–400)
PMV BLD AUTO: 10.2 FL (ref 8.9–12.9)
POTASSIUM SERPL-SCNC: 4.4 MMOL/L (ref 3.5–5.1)
PROT SERPL-MCNC: 7 G/DL (ref 6.4–8.2)
RBC # BLD AUTO: 4.27 M/UL (ref 3.8–5.2)
SODIUM SERPL-SCNC: 142 MMOL/L (ref 136–145)
TRIGL SERPL-MCNC: 179 MG/DL
TSH SERPL DL<=0.05 MIU/L-ACNC: 1.45 UIU/ML (ref 0.36–3.74)
VLDLC SERPL CALC-MCNC: 35.8 MG/DL
WBC # BLD AUTO: 4.9 K/UL (ref 3.6–11)

## 2024-01-31 ENCOUNTER — TELEPHONE (OUTPATIENT)
Age: 54
End: 2024-01-31

## 2024-01-31 RX ORDER — LEVOCETIRIZINE DIHYDROCHLORIDE 5 MG/1
5 TABLET, FILM COATED ORAL DAILY
Qty: 90 TABLET | Refills: 3 | Status: SHIPPED | OUTPATIENT
Start: 2024-01-31

## 2024-01-31 RX ORDER — LEVOCETIRIZINE DIHYDROCHLORIDE 5 MG/1
5 TABLET, FILM COATED ORAL DAILY
Qty: 90 TABLET | Refills: 3 | Status: SHIPPED | OUTPATIENT
Start: 2024-01-31 | End: 2024-01-31 | Stop reason: SDUPTHER

## 2024-01-31 NOTE — TELEPHONE ENCOUNTER
Pharmacy sent a request in stating levocetirizine (XYZAL) 5 MG tablet isn't covered by insurance but states insurance covers Desloratadine. Asking if you could send in a request for that instead. Thanks.

## 2024-02-14 RX ORDER — LEVOCETIRIZINE DIHYDROCHLORIDE 5 MG/1
5 TABLET, FILM COATED ORAL DAILY
Qty: 90 TABLET | Refills: 3 | Status: SHIPPED | OUTPATIENT
Start: 2024-02-14

## 2024-02-21 RX ORDER — LEVOTHYROXINE SODIUM 112 UG/1
TABLET ORAL
Qty: 90 TABLET | Refills: 0 | Status: SHIPPED | OUTPATIENT
Start: 2024-02-21

## 2024-03-04 RX ORDER — LEVOCETIRIZINE DIHYDROCHLORIDE 5 MG/1
5 TABLET, FILM COATED ORAL DAILY
Qty: 90 TABLET | Refills: 3 | Status: SHIPPED | OUTPATIENT
Start: 2024-03-04

## 2024-03-05 ENCOUNTER — TELEPHONE (OUTPATIENT)
Age: 54
End: 2024-03-05

## 2024-03-05 NOTE — TELEPHONE ENCOUNTER
Areli with [facility] called in reference to medication clearance for pt to stop Aspirin. Pt is scheduled to have surgery on 03/08/2024.     Phone # [phone]  Fax # [phone]

## 2024-04-03 DIAGNOSIS — I48.19 OTHER PERSISTENT ATRIAL FIBRILLATION (HCC): ICD-10-CM

## 2024-04-04 RX ORDER — METOPROLOL SUCCINATE 50 MG/1
50 TABLET, EXTENDED RELEASE ORAL DAILY
Qty: 90 TABLET | Refills: 3 | Status: SHIPPED | OUTPATIENT
Start: 2024-04-04

## 2024-04-07 RX ORDER — LEVOTHYROXINE SODIUM 112 UG/1
TABLET ORAL
Qty: 90 TABLET | Refills: 0 | Status: SHIPPED | OUTPATIENT
Start: 2024-04-07

## 2024-07-07 RX ORDER — TRAZODONE HYDROCHLORIDE 100 MG/1
100 TABLET ORAL NIGHTLY
Qty: 90 TABLET | Refills: 1 | Status: SHIPPED | OUTPATIENT
Start: 2024-07-07

## 2024-08-08 NOTE — PROGRESS NOTES
Gilma Miller is a 53 y.o. female returns for an annual exam     No chief complaint on file.      No LMP recorded. Patient has had a hysterectomy.  Her periods are absent in flow.   She does not have dysmenorrhea.  Problems: {problem:42930}  Birth Control: hysterectomy  Last Pap: normal hpv neg obtained 11/22/22  She does not have a history of SHAHLA 2, 3 or cervical cancer.   Last Mammogram: had a recent mammogram 7/25/24 which was negative for malignancy.    Last colonoscopy:       1. Have you been to the ER, urgent care clinic, or hospitalized since your last visit? {YES/NO:19726::\"Yes- When: ***. Where: ***.  Reason for visit: ***\"}    2. Have you seen or consulted any other health care providers outside of the [facility] since your last visit? {YES/NO:19726::\"Yes- When: ***. Where: ***.  Reason for visit: ***\"}    Examination chaperoned by NIDA RAND LPN.

## 2024-08-09 ENCOUNTER — OFFICE VISIT (OUTPATIENT)
Age: 54
End: 2024-08-09

## 2024-08-09 VITALS — WEIGHT: 211.4 LBS | BODY MASS INDEX: 31.22 KG/M2 | SYSTOLIC BLOOD PRESSURE: 105 MMHG | DIASTOLIC BLOOD PRESSURE: 76 MMHG

## 2024-08-09 DIAGNOSIS — Z01.419 WELL WOMAN EXAM: Primary | ICD-10-CM

## 2024-08-09 NOTE — PROGRESS NOTES
Gilma Miller is a ,  53 y.o. female White (non-) whose No LMP recorded. Patient has had a hysterectomy.  was on  who presents for her annual checkup. She is having no problems.    Hormone Status:    She is not having vasomotor symptoms.    The patient is not using HRT     Sexual history:    She  has no history on file for sexual activity.    Pap and Mammogram History:    No LMP recorded. Patient has had a hysterectomy.  Her periods are absent in flow.   She does not have dysmenorrhea.  Problems: no problems  Birth Control: hysterectomy  Last Pap: normal hpv neg obtained 22  She does not have a history of SHAHLA 2, 3 or cervical cancer.   Last Mammogram: had a recent mammogram 24 which was negative for malignancy.    Last colonoscopy:        Breast Cancer History    She has no family history of breast cancer.  Family History   Problem Relation Age of Onset    Kidney Disease Father     Migraines Mother     Hypertension Mother          Past Medical History:   Diagnosis Date    Anxiety 2010    Atrial fibrillation (HCC) 2018    Breast cancer screening by mammogram 2024    BI-RADS 1: Negative. No mammographic evidence of malignancy    Hypothyroid 2010    Ill-defined condition     depression    Migraine 2010    Psychotic disorder (HCC)      Past Surgical History:   Procedure Laterality Date    HYSTERECTOMY (CERVIX STATUS UNKNOWN)  2015    [facility]inci LTH/BSO    ORTHOPEDIC SURGERY Right     heel and wrist    OTHER SURGICAL HISTORY Right 2024    knee surgery    [facility] UNLISTED PROCEDURE ABDOMEN PERITONEUM & OMENTUM      laparotomy with appendectomy    SALPINGECTOMY      ectopic    TUBAL LIGATION       Tobacco History:  reports that she quit smoking about 7 years ago. Her smoking use included cigarettes. She has never used smokeless tobacco.  Alcohol Abuse:  reports current alcohol use.  Drug Abuse:  reports no history of drug use.  Current Outpatient Medications

## 2024-08-09 NOTE — PROGRESS NOTES
Gilma Miller is a 53 y.o. female returns for an annual exam     Chief Complaint   Patient presents with    Annual Exam       No LMP recorded. Patient has had a hysterectomy.  Her periods are absent in flow.   She does not have dysmenorrhea.  Problems: no problems  Birth Control: hysterectomy  Last Pap: normal hpv neg obtained 11/22/22  She does not have a history of SHAHLA 2, 3 or cervical cancer.   Last Mammogram: had a recent mammogram 7/25/24 which was negative for malignancy.    Last colonoscopy:       1. Have you been to the ER, urgent care clinic, or hospitalized since your last visit? No    2. Have you seen or consulted any other health care providers outside of the [facility] since your last visit? No    Examination chaperoned by Dinah Rutherford LPN.

## 2024-08-22 RX ORDER — LEVOTHYROXINE SODIUM 112 UG/1
TABLET ORAL
Qty: 90 TABLET | Refills: 0 | Status: SHIPPED | OUTPATIENT
Start: 2024-08-22

## 2024-10-01 ENCOUNTER — TELEPHONE (OUTPATIENT)
Age: 54
End: 2024-10-01

## 2024-10-01 NOTE — TELEPHONE ENCOUNTER
Two patient identifiers used      54 year old patient last seen in the office on 8/9/2024 and had mammogram on 7/25/2024    Patient received letter of normal mammogram on 7/25/2024 and MD reviewed imaging and sent this message in my chart      Patient Communication     Edit Comments   Add Notifications  Back to Top    Mammo normal   Written by Carmen Rubio MD on 7/25/2024 10:28 AM EDT      Patient calling about a letter that pop up in her my chart about additional imaging and needed clarification.    This nurse spoke to mammogram tech and was advised that mammogram was normal per radiologist and it was and auto generated letter.    Patient verbalized understanding.

## 2024-11-15 ENCOUNTER — OFFICE VISIT (OUTPATIENT)
Age: 54
End: 2024-11-15
Payer: COMMERCIAL

## 2024-11-15 VITALS
BODY MASS INDEX: 30.96 KG/M2 | HEIGHT: 69 IN | WEIGHT: 209 LBS | OXYGEN SATURATION: 97 % | SYSTOLIC BLOOD PRESSURE: 100 MMHG | HEART RATE: 86 BPM | DIASTOLIC BLOOD PRESSURE: 78 MMHG

## 2024-11-15 DIAGNOSIS — I48.91 ATRIAL FIBRILLATION, UNSPECIFIED TYPE (HCC): Primary | ICD-10-CM

## 2024-11-15 DIAGNOSIS — I49.3 VENTRICULAR PREMATURE DEPOLARIZATION: ICD-10-CM

## 2024-11-15 PROCEDURE — 99213 OFFICE O/P EST LOW 20 MIN: CPT | Performed by: INTERNAL MEDICINE

## 2024-11-15 PROCEDURE — 93000 ELECTROCARDIOGRAM COMPLETE: CPT | Performed by: INTERNAL MEDICINE

## 2024-11-15 RX ORDER — ATENOLOL 25 MG/1
TABLET ORAL
Qty: 2 TABLET | Refills: 0 | Status: SHIPPED | OUTPATIENT
Start: 2024-11-15

## 2024-11-15 NOTE — PROGRESS NOTES
Chief Complaint   Patient presents with    Atrial Fibrillation     THYROID,PVCs     Vitals:    11/15/24 1435   BP: 100/78   Site: Left Upper Arm   Position: Sitting   Cuff Size: Medium Adult   Pulse: 86   SpO2: 97%   Weight: 94.8 kg (209 lb)   Height: 1.753 m (5' 9\")      /78 (Site: Left Upper Arm, Position: Sitting, Cuff Size: Medium Adult)   Pulse 86   Ht 1.753 m (5' 9\")   Wt 94.8 kg (209 lb)   SpO2 97%   BMI 30.86 kg/m²          
dimensions.      C.StressNuclear/Stress ECHO/Stress test: Exercise Cardiolite 4/11/18 - 5:00. Normal perfusion. EF 76%.      D.Vascular:      E. EP: 6/2018 Holter - 24 Hr Holter monitor 6/18/18 - SR, rates 53 to 132. 31 PAC's, 1 episode of atrial tachycardia, frequent PVC's. Diary entry correlates with SR with PVC's - rates recorded 72 to  80 bpm.      F. Miscellaneous:      Subjective:   Gilma Miller is a 52 y.o.  female who returns for follow up visit      Patient is doing well.  No chest pain.  Occasional palpitations .  .  No swelling lower extremities.   Under stress secondary to her  going through treatment for melanoma.  He is also a patient of mine.   ROS:   (bold if positive,  if negative)              Medications before admission:        Current Outpatient Medications   Medication Instructions    aspirin 81 MG EC tablet Oral, DAILY    diclofenac (VOLTAREN) 75 mg, Oral, 2 TIMES DAILY    levocetirizine (XYZAL) 5 mg, Oral, DAILY    levothyroxine (SYNTHROID) 112 MCG tablet TAKE 1 TABLET BY MOUTH EVERY DAY BEFORE BREAKFAST    metoprolol succinate (TOPROL XL) 50 mg, Oral, DAILY    mupirocin (BACTROBAN) 2 % ointment Topical, 2 TIMES DAILY PRN    traZODone (DESYREL) 100 mg, Oral, NIGHTLY               Family History of CAD:    No      Social History:  Current  Smoker No      Physical Exam:   Visit Vitals   Blood pressure 100/78, pulse 86, height 1.753 m (5' 9\"), weight 94.8 kg (209 lb), SpO2 97%.            Gen: Well-developed, well-nourished, in no acute distress   Neck: Supple,No JVD, No Carotid Bruit,    Resp: No accessory muscle use, Clear breath sounds, No rales or rhonchi   Card: Irregular Rate,Rythm,Normal S1, S2, No murmurs, rubs or gallop. No thrills.    Abd:  Soft, BS+,    MSK: No cyanosis   Skin: No rashes     Neuro: moving all four extremities , follows commands appropriately   Psych:  Good insight, oriented to person, place , alert, Nml Affect   LE: No edema      EKG: Atrial fibrillation

## 2024-11-25 RX ORDER — LEVOTHYROXINE SODIUM 112 UG/1
TABLET ORAL
Qty: 90 TABLET | Refills: 0 | Status: SHIPPED | OUTPATIENT
Start: 2024-11-25

## 2024-12-11 ENCOUNTER — HOSPITAL ENCOUNTER (OUTPATIENT)
Facility: HOSPITAL | Age: 54
Discharge: HOME | End: 2024-12-14
Attending: INTERNAL MEDICINE
Payer: COMMERCIAL

## 2024-12-11 VITALS
HEART RATE: 88 BPM | DIASTOLIC BLOOD PRESSURE: 73 MMHG | RESPIRATION RATE: 18 BRPM | OXYGEN SATURATION: 96 % | SYSTOLIC BLOOD PRESSURE: 115 MMHG

## 2024-12-11 DIAGNOSIS — I48.91 ATRIAL FIBRILLATION, UNSPECIFIED TYPE (HCC): ICD-10-CM

## 2024-12-11 DIAGNOSIS — I49.3 VENTRICULAR PREMATURE DEPOLARIZATION: ICD-10-CM

## 2024-12-11 PROCEDURE — 75574 CT ANGIO HRT W/3D IMAGE: CPT | Performed by: INTERNAL MEDICINE

## 2024-12-11 PROCEDURE — 6370000000 HC RX 637 (ALT 250 FOR IP): Performed by: INTERNAL MEDICINE

## 2024-12-11 PROCEDURE — 75574 CT ANGIO HRT W/3D IMAGE: CPT

## 2024-12-11 PROCEDURE — 6360000004 HC RX CONTRAST MEDICATION: Performed by: INTERNAL MEDICINE

## 2024-12-11 RX ORDER — NITROGLYCERIN 0.4 MG/1
0.8 TABLET SUBLINGUAL ONCE
Status: COMPLETED | OUTPATIENT
Start: 2024-12-11 | End: 2024-12-11

## 2024-12-11 RX ORDER — IOPAMIDOL 755 MG/ML
80 INJECTION, SOLUTION INTRAVASCULAR
Status: COMPLETED | OUTPATIENT
Start: 2024-12-11 | End: 2024-12-11

## 2024-12-11 RX ADMIN — NITROGLYCERIN 0.8 MG: 0.4 TABLET SUBLINGUAL at 07:29

## 2024-12-11 RX ADMIN — IOPAMIDOL 80 ML: 755 INJECTION, SOLUTION INTRAVENOUS at 07:35

## 2024-12-11 NOTE — PROGRESS NOTES
0715  Obtained patient from [facility] for CT Coronary. Patient ambulated back to radiology holding. Verified name and . Patient is alert and oriented. Allergies reviewed. No complaints of pain. Vitals taken. HR 64 /62 O2 96% on room air. LAC 20G PIV placed with blood return noted. CT called and ready for patient. Administered Nitro SL 0.8 mg per CT Coronary protocol. Took patient via wheelchair to CT. Patient positioned onto CT table supine. Attached to CT ECG and contrast injector.  0750  Patient tolerated procedure well with no adverse effects from contrast. No complaints of being dizzy or lightheaded. Vitals taken. HR 88 /73 O2 96%. LAC PIV removed with no complications. Educated patient to drink lots of fluids today. Patient verbalized understanding. Patient ambulated back out to [facility].  
Home

## 2024-12-19 ENCOUNTER — OFFICE VISIT (OUTPATIENT)
Age: 54
End: 2024-12-19
Payer: COMMERCIAL

## 2024-12-19 VITALS
HEIGHT: 69 IN | HEART RATE: 86 BPM | OXYGEN SATURATION: 97 % | SYSTOLIC BLOOD PRESSURE: 130 MMHG | BODY MASS INDEX: 31.4 KG/M2 | RESPIRATION RATE: 18 BRPM | WEIGHT: 212 LBS | DIASTOLIC BLOOD PRESSURE: 88 MMHG

## 2024-12-19 DIAGNOSIS — Z01.818 PREOP TESTING: ICD-10-CM

## 2024-12-19 DIAGNOSIS — I48.0 PAROXYSMAL ATRIAL FIBRILLATION (HCC): Primary | ICD-10-CM

## 2024-12-19 DIAGNOSIS — I49.3 VENTRICULAR PREMATURE DEPOLARIZATION: ICD-10-CM

## 2024-12-19 DIAGNOSIS — I48.91 ATRIAL FIBRILLATION, UNSPECIFIED TYPE (HCC): ICD-10-CM

## 2024-12-19 PROCEDURE — 99214 OFFICE O/P EST MOD 30 MIN: CPT | Performed by: INTERNAL MEDICINE

## 2024-12-19 PROCEDURE — 93000 ELECTROCARDIOGRAM COMPLETE: CPT | Performed by: INTERNAL MEDICINE

## 2024-12-19 RX ORDER — AMIODARONE HYDROCHLORIDE 200 MG/1
TABLET ORAL
Qty: 100 TABLET | Refills: 0 | Status: SHIPPED | OUTPATIENT
Start: 2024-12-19 | End: 2024-12-20

## 2024-12-19 NOTE — H&P (VIEW-ONLY)
Chris Nguyen MD      [address],[facility],[place] [address]      Office [phone],Fax [phone]         Gilma Miller is a 52 y.o.  female is here for f/u visit.         Primary care physician:   Lia Mckeon, NP            Dear Ms Mike,      I had the pleasure of seeing  Ms. Gilma Miller in the office today.      Chief complaint:     As documented in EMR        Assessment:     A-fib with controlled ventricular response  Hx of PAF ( Atrial fibrillation-diagnosed on 3/12/18-incidental finding during routine office visit.).    PVC   Anxiety   Hypothyroidism - controlled per pt  Abnormal [id number] MI age management      Plan:      On ASA/metoprolol.previously she has not wanted to be on anticoagulation.  Now that she is in A-fib, she agrees to be on Eliquis 5 mg twice daily.  Continue metoprolol.  Amiodarone 200 mg-loading dose followed by 1 tablet daily- discussed SE with pts  Will schedule for DCCV-advised not to miss any Eliquis dose.  Will also refer to EP.  Echocardiogram-12/2024-normal EF  Coronary CTA-12/2024-no significant CAD/calcium score 0/ myocardial bridging LAD  Monitor blood pressure.  Patient has a blood pressure monitor at home.    1/9/2024.  She will get her TSH checked by her PCP.    Follow-up 3-4 mths/earlier as needed         Aggressive cardiovascular risk factor modification.                 Patient understands the plan. All questions were answered to the patient's satisfaction.      Medication Side Effects and Warnings were discussed with patient: yes   Patient Labs were reviewed and or requested:  yes   Patient Past Records were reviewed and or requested: yes      I appreciate the opportunity to be involved in care. See note below for details. Please do not hesitate to contact us with questions  or concerns.      Chris Nguyen MD      Cardiac Testing/ Procedures:      A.Cardiac Cath/PCI:

## 2024-12-19 NOTE — PROGRESS NOTES
Chris Nguyen MD      [address],[facility],[place] [address]      Office [phone],Fax [phone]         Gilma Miller is a 52 y.o.  female is here for f/u visit.         Primary care physician:   Lia Mckeon, NP            Dear Ms Mike,      I had the pleasure of seeing  Ms. Gilma Miller in the office today.      Chief complaint:     As documented in EMR        Assessment:     A-fib with controlled ventricular response  Hx of PAF ( Atrial fibrillation-diagnosed on 3/12/18-incidental finding during routine office visit.).    PVC   Anxiety   Hypothyroidism - controlled per pt  Abnormal [id number]-qsxpyitm MI age management      Plan:      On ASA/metoprolol.previously she has not wanted to be on anticoagulation.  Now that she is in A-fib, she agrees to be on Eliquis 5 mg twice daily.  Continue metoprolol.  Amiodarone 200 mg-loading dose followed by 1 tablet daily- discussed SE with pts  Will schedule for DCCV-advised not to miss any Eliquis dose.  Will also refer to EP.  Echocardiogram-12/2024-normal EF  Coronary CTA-12/2024-no significant CAD/calcium score 0/ myocardial bridging LAD  Monitor blood pressure.  Patient has a blood pressure monitor at home.    1/9/2024.  She will get her TSH checked by her PCP.    Follow-up 3-4 mths/earlier as needed         Aggressive cardiovascular risk factor modification.                 Patient understands the plan. All questions were answered to the patient's satisfaction.      Medication Side Effects and Warnings were discussed with patient: yes   Patient Labs were reviewed and or requested:  yes   Patient Past Records were reviewed and or requested: yes      I appreciate the opportunity to be involved in care. See note below for details. Please do not hesitate to contact us with questions  or concerns.      Chris Nguyen MD      Cardiac Testing/ Procedures:      A.Cardiac Cath/PCI:

## 2024-12-19 NOTE — PROGRESS NOTES
Chief Complaint   Patient presents with    Atrial Fibrillation    Other     PVC's         Chest Pain  No        Last Lipids   Lab Results   Component Value Date    CHOL 189 01/09/2024    TRIG 179 (H) 01/09/2024    HDL 41 01/09/2024    VLDL 35.8 01/09/2024    CHOLHDLRATIO 4.6 01/09/2024        Refills  Eliquis     /88 (Site: Left Upper Arm, Position: Sitting)   Pulse 86   Resp 18   Ht 1.753 m (5' 9\")   Wt 96.2 kg (212 lb)   SpO2 97%   BMI 31.31 kg/m²       Have you been to the ER, urgent care clinic since your last visit? No  Hospitalized since your last visit? NO    2. Have you seen or consulted with any other health care providers outside of the [facility] since your last visit?  No

## 2024-12-20 RX ORDER — AMIODARONE HYDROCHLORIDE 200 MG/1
TABLET ORAL
Qty: 90 TABLET | Refills: 1 | Status: SHIPPED | OUTPATIENT
Start: 2024-12-20

## 2024-12-23 ENCOUNTER — TELEPHONE (OUTPATIENT)
Age: 54
End: 2024-12-23

## 2024-12-23 ENCOUNTER — DIRECT ADMIT ORDERS (OUTPATIENT)
Facility: HOSPITAL | Age: 54
End: 2024-12-23

## 2024-12-23 DIAGNOSIS — I48.0 PAROXYSMAL ATRIAL FIBRILLATION (HCC): Primary | ICD-10-CM

## 2024-12-23 RX ORDER — SODIUM CHLORIDE 9 MG/ML
INJECTION, SOLUTION INTRAVENOUS CONTINUOUS
OUTPATIENT
Start: 2025-01-02

## 2024-12-23 RX ORDER — SODIUM CHLORIDE 0.9 % (FLUSH) 0.9 %
5-40 SYRINGE (ML) INJECTION PRN
OUTPATIENT
Start: 2025-01-02

## 2024-12-23 RX ORDER — SODIUM CHLORIDE 0.9 % (FLUSH) 0.9 %
5-40 SYRINGE (ML) INJECTION EVERY 12 HOURS SCHEDULED
OUTPATIENT
Start: 2025-01-02 | End: 2025-01-02

## 2024-12-23 RX ORDER — SODIUM CHLORIDE 9 MG/ML
INJECTION, SOLUTION INTRAVENOUS PRN
OUTPATIENT
Start: 2025-01-02

## 2024-12-23 NOTE — TELEPHONE ENCOUNTER
Spoke to Pt of DCCV Scheduled for 1/2/2025 at 10:00 AM arrive at 9:00 AM  Check in at the 1st floor outpt Reg. Desk   You will need a  must stay on site  NPO from midnight prior to procedure  Have  Labs done by 12/26/2024    Medications:  Okay to take     VO by /Nurse: Portillo

## 2024-12-27 LAB
BUN SERPL-MCNC: 17 MG/DL (ref 6–24)
BUN/CREAT SERPL: 16 (ref 9–23)
CALCIUM SERPL-MCNC: 8.7 MG/DL (ref 8.7–10.2)
CHLORIDE SERPL-SCNC: 104 MMOL/L (ref 96–106)
CO2 SERPL-SCNC: 25 MMOL/L (ref 20–29)
CREAT SERPL-MCNC: 1.05 MG/DL (ref 0.57–1)
EGFRCR SERPLBLD CKD-EPI 2021: 63 ML/MIN/1.73
GLUCOSE SERPL-MCNC: 103 MG/DL (ref 70–99)
MAGNESIUM SERPL-MCNC: 1.9 MG/DL (ref 1.6–2.3)
POTASSIUM SERPL-SCNC: 4.8 MMOL/L (ref 3.5–5.2)
SODIUM SERPL-SCNC: 141 MMOL/L (ref 134–144)

## 2025-01-02 ENCOUNTER — HOSPITAL ENCOUNTER (OUTPATIENT)
Facility: HOSPITAL | Age: 55
Discharge: HOME | End: 2025-01-02
Attending: INTERNAL MEDICINE
Payer: COMMERCIAL

## 2025-01-02 VITALS
DIASTOLIC BLOOD PRESSURE: 74 MMHG | SYSTOLIC BLOOD PRESSURE: 112 MMHG | RESPIRATION RATE: 13 BRPM | OXYGEN SATURATION: 99 % | HEIGHT: 69 IN | WEIGHT: 212 LBS | BODY MASS INDEX: 31.4 KG/M2 | HEART RATE: 54 BPM

## 2025-01-02 DIAGNOSIS — I48.91 AF (ATRIAL FIBRILLATION) (HCC): ICD-10-CM

## 2025-01-02 LAB — ECHO BSA: 2.16 M2

## 2025-01-02 PROCEDURE — 99152 MOD SED SAME PHYS/QHP 5/>YRS: CPT

## 2025-01-02 PROCEDURE — 2500000003 HC RX 250 WO HCPCS: Performed by: INTERNAL MEDICINE

## 2025-01-02 PROCEDURE — 92960 CARDIOVERSION ELECTRIC EXT: CPT

## 2025-01-02 RX ADMIN — METHOHEXITAL SODIUM 20 MG: 500 INJECTION, POWDER, LYOPHILIZED, FOR SOLUTION INTRAMUSCULAR; INTRAVENOUS; RECTAL at 09:48

## 2025-01-02 RX ADMIN — METHOHEXITAL SODIUM 30 MG: 500 INJECTION, POWDER, LYOPHILIZED, FOR SOLUTION INTRAMUSCULAR; INTRAVENOUS; RECTAL at 09:46

## 2025-01-02 NOTE — PROGRESS NOTES
Patient arrived to Non-Invasive Cardiology Lab for Out Patient DCCV Procedure. Staff introduced to patient. Patient identifiers verified with Name and Date of Birth. Procedure verified with patient. Consent forms reviewed and signed by patient or authorized representative and verified. Allergies verified. Patient informed of procedure and plan of care. Questions answered with review.     Patient on cardiac monitor, non-invasive blood pressure, SPO2 monitor. Patient is A&Ox3. Patient reports no complaints. Patient belongings and valuables to remain in the room with patient.    Patient on stretcher, in low position, with side rails up and brakes locked. Patient instructed to call for assistance as needed.    Family in waiting room.

## 2025-01-02 NOTE — PROCEDURES
BRIEF PROCEDURE NOTE    Date of Procedure: 1/2/2025   Preoperative Diagnosis: atrial fibrillation  Postoperative Diagnosis: same   Procedure: Synchronized DC cardioversion  Cardiologist: Chris Nguyen MD  Anesthesia:  IV sedation  Estimated Blood Loss: None  Findings: Following informed consent, the patient was sedated with intravenous Methohexital ( Brevital) . 300 joule biphasic shock was delivered using anterior and posterior pads converting atrial fibrillation to sinus rhythm. No complications were noted.     Complications: none  .     Patient's vital signs were stable and he/she was moving all four extremities at the end of procedure.    Chris Nguyen MD

## 2025-01-02 NOTE — PROGRESS NOTES
Discharge instructions reviewed with patient and , Eleuterio. Allowed adequate time to ask questions, all questions answered. Printed copy of AVS given to patient. All belongings gathered, IV and tele discontinued. Transported via wheelchair to main entrance and into care of family.

## 2025-01-02 NOTE — INTERVAL H&P NOTE
Update History & Physical    The patient's History and Physical of 12/19/24 ,  was reviewed with the patient and I examined the patient. There was no change.       Plan: The risks, benefits, expected outcome, and alternative to the recommended procedure have been discussed with the patient. Patient understands and wants to proceed with the procedure.     Has not missed any Eliquis dose      Electronically signed by Chris Nguyen MD on 1/2/2025 at 9:34 AM

## 2025-01-13 SDOH — ECONOMIC STABILITY: INCOME INSECURITY: IN THE LAST 12 MONTHS, WAS THERE A TIME WHEN YOU WERE NOT ABLE TO PAY THE MORTGAGE OR RENT ON TIME?: NO

## 2025-01-13 SDOH — ECONOMIC STABILITY: FOOD INSECURITY: WITHIN THE PAST 12 MONTHS, YOU WORRIED THAT YOUR FOOD WOULD RUN OUT BEFORE YOU GOT MONEY TO BUY MORE.: NEVER TRUE

## 2025-01-13 SDOH — ECONOMIC STABILITY: FOOD INSECURITY: WITHIN THE PAST 12 MONTHS, THE FOOD YOU BOUGHT JUST DIDN'T LAST AND YOU DIDN'T HAVE MONEY TO GET MORE.: NEVER TRUE

## 2025-01-13 SDOH — ECONOMIC STABILITY: TRANSPORTATION INSECURITY
IN THE PAST 12 MONTHS, HAS THE LACK OF TRANSPORTATION KEPT YOU FROM MEDICAL APPOINTMENTS OR FROM GETTING MEDICATIONS?: NO

## 2025-01-14 ENCOUNTER — OFFICE VISIT (OUTPATIENT)
Facility: CLINIC | Age: 55
End: 2025-01-14
Payer: COMMERCIAL

## 2025-01-14 VITALS
SYSTOLIC BLOOD PRESSURE: 121 MMHG | BODY MASS INDEX: 31.25 KG/M2 | RESPIRATION RATE: 19 BRPM | TEMPERATURE: 98.2 F | OXYGEN SATURATION: 99 % | HEIGHT: 69 IN | WEIGHT: 211 LBS | HEART RATE: 55 BPM | DIASTOLIC BLOOD PRESSURE: 78 MMHG

## 2025-01-14 DIAGNOSIS — I48.91 ATRIAL FIBRILLATION, UNSPECIFIED TYPE (HCC): ICD-10-CM

## 2025-01-14 DIAGNOSIS — Z01.818 PREOP TESTING: ICD-10-CM

## 2025-01-14 DIAGNOSIS — E03.1 CONGENITAL HYPOTHYROIDISM WITHOUT GOITER: ICD-10-CM

## 2025-01-14 DIAGNOSIS — I49.3 VENTRICULAR PREMATURE DEPOLARIZATION: ICD-10-CM

## 2025-01-14 DIAGNOSIS — Z00.00 WELL EXAM WITHOUT ABNORMAL FINDINGS OF PATIENT 18 YEARS OF AGE OR OLDER: Primary | ICD-10-CM

## 2025-01-14 DIAGNOSIS — Z00.00 WELL EXAM WITHOUT ABNORMAL FINDINGS OF PATIENT 18 YEARS OF AGE OR OLDER: ICD-10-CM

## 2025-01-14 DIAGNOSIS — I48.0 PAROXYSMAL ATRIAL FIBRILLATION (HCC): ICD-10-CM

## 2025-01-14 PROCEDURE — 99213 OFFICE O/P EST LOW 20 MIN: CPT | Performed by: NURSE PRACTITIONER

## 2025-01-14 PROCEDURE — 99396 PREV VISIT EST AGE 40-64: CPT | Performed by: NURSE PRACTITIONER

## 2025-01-14 ASSESSMENT — PATIENT HEALTH QUESTIONNAIRE - PHQ9
SUM OF ALL RESPONSES TO PHQ QUESTIONS 1-9: 0
2. FEELING DOWN, DEPRESSED OR HOPELESS: NOT AT ALL
SUM OF ALL RESPONSES TO PHQ QUESTIONS 1-9: 0
SUM OF ALL RESPONSES TO PHQ9 QUESTIONS 1 & 2: 0
1. LITTLE INTEREST OR PLEASURE IN DOING THINGS: NOT AT ALL

## 2025-01-14 NOTE — PROGRESS NOTES
Gilma Miller (:  1970) is a 54 y.o. female, Established patient, here for evaluation of the following chief complaint(s):  Follow-up and Lab Collection (Thyroid)         Assessment & Plan  1. Atrial fibrillation.  He underwent cardioversion and is currently in rhythm. However, an ablation is still being considered. He will see the surgeon on 2025 to discuss further. He is currently taking metoprolol, Eliquis, and amiodarone to manage his condition.    2. Insomnia.  He had to stop taking his previous sleeping pill due to interactions with a new medication. He is currently taking trazodone for sleep.    3. Thyroid disorder.  He is taking thyroid medication at a dose of 112 mcg. He has enough medication to last through January. Refills will be updated once lab results are back to ensure the dosage is still appropriate.    4. Wellness exam.  Labs will be ordered to check cholesterol levels as part of his wellness exam.    PROCEDURE  The patient underwent cardioversion for atrial fibrillation.    Results  Laboratory Studies  Magnesium, potassium, sugar, kidney functions were normal.  1. Well exam without abnormal findings of patient 18 years of age or older  -     Lipid Panel; Future  -     TSH; Future  -     Comprehensive Metabolic Panel; Future  -     CBC with Auto Differential; Future  2. Congenital hypothyroidism without goiter  -     TSH; Future  3. Paroxysmal atrial fibrillation (HCC)  -     Comprehensive Metabolic Panel; Future  -     CBC with Auto Differential; Future    No follow-ups on file.       Subjective   History of Present Illness  The patient presents for a wellness exam.    He has been diagnosed with atrial fibrillation and underwent cardioversion. Despite achieving rhythm control, his cardiologist has recommended an ablation procedure. A consultation with the surgeon is scheduled for 2025 to discuss the potential ablation. His current medication regimen includes

## 2025-01-14 NOTE — PROGRESS NOTES
Chief Complaint   Patient presents with    Follow-up    Lab Collection     Thyroid     Patient presents in the office today for a f/u and labs.  Patient stated she is experiencing a cough.   No other concerns.    \"Have you been to the ER, urgent care clinic since your last visit?  Hospitalized since your last visit?\"    YES - When: approximately 1/02/25 ago.  Where and Why: [facility].    “Have you seen or consulted any other health care providers outside our system since your last visit?”    NO      “Have you had a colorectal cancer screening such as a colonoscopy/FIT/Cologuard?    NO    No colonoscopy on file  No cologuard on file  No FIT/FOBT on file   No flexible sigmoidoscopy on file

## 2025-01-16 LAB
ALBUMIN SERPL-MCNC: 4 G/DL (ref 3.5–5)
ALBUMIN/GLOB SERPL: 1.4 (ref 1.1–2.2)
ALP SERPL-CCNC: 89 U/L (ref 45–117)
ALT SERPL-CCNC: 13 U/L (ref 12–78)
ANION GAP SERPL CALC-SCNC: 7 MMOL/L (ref 2–12)
AST SERPL-CCNC: 16 U/L (ref 15–37)
BASOPHILS # BLD: 0.05 K/UL (ref 0–0.1)
BASOPHILS NFR BLD: 1 % (ref 0–1)
BILIRUB SERPL-MCNC: 0.4 MG/DL (ref 0.2–1)
BUN SERPL-MCNC: 20 MG/DL (ref 6–20)
BUN/CREAT SERPL: 19 (ref 12–20)
CALCIUM SERPL-MCNC: 9.2 MG/DL (ref 8.5–10.1)
CHLORIDE SERPL-SCNC: 109 MMOL/L (ref 97–108)
CHOLEST SERPL-MCNC: 207 MG/DL
CO2 SERPL-SCNC: 25 MMOL/L (ref 21–32)
CREAT SERPL-MCNC: 1.06 MG/DL (ref 0.55–1.02)
DIFFERENTIAL METHOD BLD: NORMAL
EOSINOPHIL # BLD: 0.15 K/UL (ref 0–0.4)
EOSINOPHIL NFR BLD: 3.1 % (ref 0–7)
ERYTHROCYTE [DISTWIDTH] IN BLOOD BY AUTOMATED COUNT: 12.5 % (ref 11.5–14.5)
GLOBULIN SER CALC-MCNC: 2.8 G/DL (ref 2–4)
GLUCOSE SERPL-MCNC: 97 MG/DL (ref 65–100)
HCT VFR BLD AUTO: 38.8 % (ref 35–47)
HDLC SERPL-MCNC: 44 MG/DL
HDLC SERPL: 4.7 (ref 0–5)
HGB BLD-MCNC: 12.6 G/DL (ref 11.5–16)
IMM GRANULOCYTES # BLD AUTO: 0.01 K/UL (ref 0–0.04)
IMM GRANULOCYTES NFR BLD AUTO: 0.2 % (ref 0–0.5)
LDLC SERPL CALC-MCNC: 118.8 MG/DL (ref 0–100)
LYMPHOCYTES # BLD: 1.67 K/UL (ref 0.8–3.5)
LYMPHOCYTES NFR BLD: 34.2 % (ref 12–49)
MCH RBC QN AUTO: 29.4 PG (ref 26–34)
MCHC RBC AUTO-ENTMCNC: 32.5 G/DL (ref 30–36.5)
MCV RBC AUTO: 90.7 FL (ref 80–99)
MONOCYTES # BLD: 0.3 K/UL (ref 0–1)
MONOCYTES NFR BLD: 6.1 % (ref 5–13)
NEUTS SEG # BLD: 2.7 K/UL (ref 1.8–8)
NEUTS SEG NFR BLD: 55.4 % (ref 32–75)
NRBC # BLD: 0 K/UL (ref 0–0.01)
NRBC BLD-RTO: 0 PER 100 WBC
PLATELET # BLD AUTO: 259 K/UL (ref 150–400)
PMV BLD AUTO: 10.1 FL (ref 8.9–12.9)
POTASSIUM SERPL-SCNC: 4.1 MMOL/L (ref 3.5–5.1)
PROT SERPL-MCNC: 6.8 G/DL (ref 6.4–8.2)
RBC # BLD AUTO: 4.28 M/UL (ref 3.8–5.2)
SODIUM SERPL-SCNC: 141 MMOL/L (ref 136–145)
TRIGL SERPL-MCNC: 221 MG/DL
TSH SERPL DL<=0.05 MIU/L-ACNC: 2.32 UIU/ML (ref 0.36–3.74)
VLDLC SERPL CALC-MCNC: 44.2 MG/DL
WBC # BLD AUTO: 4.9 K/UL (ref 3.6–11)

## 2025-01-17 LAB
ANION GAP SERPL CALC-SCNC: 7 MMOL/L (ref 2–12)
BUN SERPL-MCNC: 19 MG/DL (ref 6–20)
BUN/CREAT SERPL: 17 (ref 12–20)
CALCIUM SERPL-MCNC: 9.1 MG/DL (ref 8.5–10.1)
CHLORIDE SERPL-SCNC: 110 MMOL/L (ref 97–108)
CO2 SERPL-SCNC: 23 MMOL/L (ref 21–32)
CREAT SERPL-MCNC: 1.15 MG/DL (ref 0.55–1.02)
GLUCOSE SERPL-MCNC: 98 MG/DL (ref 65–100)
POTASSIUM SERPL-SCNC: 4.1 MMOL/L (ref 3.5–5.1)
SODIUM SERPL-SCNC: 140 MMOL/L (ref 136–145)

## 2025-02-19 ENCOUNTER — COMMUNITY OUTREACH (OUTPATIENT)
Facility: CLINIC | Age: 55
End: 2025-02-19

## 2025-02-24 RX ORDER — LEVOTHYROXINE SODIUM 112 UG/1
TABLET ORAL
Qty: 90 TABLET | Refills: 0 | Status: SHIPPED | OUTPATIENT
Start: 2025-02-24

## 2025-02-25 ENCOUNTER — OFFICE VISIT (OUTPATIENT)
Age: 55
End: 2025-02-25

## 2025-02-25 VITALS
DIASTOLIC BLOOD PRESSURE: 60 MMHG | WEIGHT: 213 LBS | BODY MASS INDEX: 31.55 KG/M2 | HEIGHT: 69 IN | HEART RATE: 56 BPM | OXYGEN SATURATION: 98 % | SYSTOLIC BLOOD PRESSURE: 120 MMHG

## 2025-02-25 DIAGNOSIS — I48.19 PERSISTENT ATRIAL FIBRILLATION (HCC): Primary | ICD-10-CM

## 2025-02-25 DIAGNOSIS — Z79.01 ANTICOAGULATED: ICD-10-CM

## 2025-02-25 DIAGNOSIS — Z79.899 HIGH RISK MEDICATION USE: ICD-10-CM

## 2025-02-25 RX ORDER — ASPIRIN 81 MG/1
81 TABLET ORAL DAILY
COMMUNITY
Start: 2025-02-25 | End: 2025-02-25

## 2025-02-25 NOTE — PROGRESS NOTES
[facility] PREP NOTE  Please fill out subjective and AF history below.  ***Dr. Fermin will DELETE before signing visit  ===========================================    Primary Cardiologist: Chris Nguyen MD    She works as customer service .    Gilma Miller presents to electrophysiology clinic for management of Atrial Arrythmias.    Her current medical conditions and PMH are detailed below.      Today's visit:  Date: 2/25/202    She was initially diagnosed with AF in 2018 after GYN surgery. She followed up with Dr. Nguyen November 2024 routinely. She was again found to be in AF at that time. He stared her on amiodarone and she underwent cardioversion 1/2/25. She is on Eliquis and denies any bleeding issues.       Denies palpitations, dizziness, syncope and fatigue. Denies chest pain. Denies shortness of breath, paroxysmal nocturnal dyspnea, orthopnea and lower extremity edema.      Cardiac Rhythm Testing     All EKGs were personally interpreted by me as below      Monitor results:  ***    ===================================================================    # Paroxysmal Atrial Fibrillation  Onset: 3/2018 (incidental after surgery)   Symptoms: none  Alcohol: on vacation   YOVANA: not tested  Prior AAD: none  Current Meds/AAD: amio 200 mg daily, Toprol 50 mg daily   Prior Cardioversion: 1/2/25  Ablation: none  LA size: LA Vol Index A/L is 35 mL/m2 (echo 12/16/24)    # Anticoagulation  [id number] score of 1 [for Female]  Anticoagulation: Eliquis 5 mg bid    # ***  ***  -- ***

## 2025-02-25 NOTE — PROGRESS NOTES
Cardiac Electrophysiology OFFICE Consultation Note     Subjective:      Gilma Miller is a pleasant 54 y.o. female.  She is a resident of [facility]    Primary Cardiologist: Chris Nguyen MD    She works in customer service.    Gilma Miller presents to electrophysiology clinic for management of Atrial Arrythmias.    Her current medical conditions and PMH are detailed below.    Today's visit:  Date: 2/25/2025  She was initially diagnosed with AF in 2018 after GYN surgery. She followed up with Dr. Nguyen November 2024 routinely. She was again found to be in AF at that time. He stared her on amiodarone and she underwent cardioversion 1/2/25. She is on Eliquis and denies any bleeding issues.   Denies palpitations, dizziness, syncope and fatigue. Denies chest pain. Denies shortness of breath, paroxysmal nocturnal dyspnea, orthopnea and lower extremity edema.      Cardiac Rhythm Testing     All EKGs were personally interpreted by me as below    EKG from December 19, 2024 showed atrial fibrillation, ventricular rate 90 bpm  EKG reviewed from 2/25/2025 shows sinus bradycardia 53 bpm.          Assessment:       ICD-10-CM    1. Paroxysmal atrial fibrillation (HCC)  I48.0 EKG 12 Lead      2. Anticoagulated  Z79.01       3. High risk medication use  Z79.899             # Persistent Atrial Fibrillation  Onset: 3/2018 (incidental after surgery)   Symptoms: none  Alcohol: on vacation   YOVANA: not tested  Prior AAD: none  Current Meds/AAD: amio 200 mg daily, Toprol 50 mg daily   Prior Cardioversion: 1/2/25  Ablation: none  LA size: Mildly dilated. LA Vol Index A/L is 35 mL/m2 (echo 12/16/24)  -- December 2024: CTA showed no CAD with calcium score of 0.    --She does not want to AF ablation at this time since her  is sick with cancer.  -- Discussed with patient that amiodarone is not a good long-term option given associated side effects.  -- Discussed alternative antiarrhythmic drug

## 2025-03-10 ENCOUNTER — TELEPHONE (OUTPATIENT)
Age: 55
End: 2025-03-10

## 2025-03-10 NOTE — TELEPHONE ENCOUNTER
Verified patient with two identifiers. This RN called pharmacy to inquire about pts prescription of Multaq. [facility] pharmacy staff stated that it was called in on 2/25, needs to request shipment for it and it would cost her $150 with insurance. Pt informed on previous MyChart encounter

## 2025-03-27 DIAGNOSIS — I48.19 OTHER PERSISTENT ATRIAL FIBRILLATION: ICD-10-CM

## 2025-03-27 RX ORDER — METOPROLOL SUCCINATE 50 MG/1
50 TABLET, EXTENDED RELEASE ORAL DAILY
Qty: 90 TABLET | Refills: 0 | Status: SHIPPED | OUTPATIENT
Start: 2025-03-27

## 2025-04-10 ENCOUNTER — OFFICE VISIT (OUTPATIENT)
Age: 55
End: 2025-04-10
Payer: COMMERCIAL

## 2025-04-10 VITALS
BODY MASS INDEX: 32.14 KG/M2 | OXYGEN SATURATION: 98 % | WEIGHT: 217 LBS | HEART RATE: 52 BPM | SYSTOLIC BLOOD PRESSURE: 100 MMHG | HEIGHT: 69 IN | DIASTOLIC BLOOD PRESSURE: 60 MMHG

## 2025-04-10 DIAGNOSIS — Z79.01 CHRONIC ANTICOAGULATION: ICD-10-CM

## 2025-04-10 DIAGNOSIS — I48.0 PAROXYSMAL ATRIAL FIBRILLATION (HCC): Primary | ICD-10-CM

## 2025-04-10 DIAGNOSIS — E78.2 MIXED HYPERLIPIDEMIA: ICD-10-CM

## 2025-04-10 PROCEDURE — 99214 OFFICE O/P EST MOD 30 MIN: CPT | Performed by: INTERNAL MEDICINE

## 2025-04-10 PROCEDURE — 93000 ELECTROCARDIOGRAM COMPLETE: CPT | Performed by: INTERNAL MEDICINE

## 2025-04-10 RX ORDER — ATORVASTATIN CALCIUM 10 MG/1
10 TABLET, FILM COATED ORAL DAILY
Qty: 90 TABLET | Refills: 3 | Status: SHIPPED | OUTPATIENT
Start: 2025-04-10

## 2025-04-10 RX ORDER — ATORVASTATIN CALCIUM 10 MG/1
10 TABLET, FILM COATED ORAL DAILY
COMMUNITY
End: 2025-04-10 | Stop reason: SDUPTHER

## 2025-04-10 NOTE — PROGRESS NOTES
Chief Complaint   Patient presents with    Atrial Fibrillation     Vitals:    04/10/25 0920   BP: 100/60   BP Site: Left Upper Arm   Patient Position: Sitting   BP Cuff Size: Medium Adult   Pulse: 52   SpO2: 98%   Weight: 98.4 kg (217 lb)   Height: 1.753 m (5' 9\")      /60 (BP Site: Left Upper Arm, Patient Position: Sitting, BP Cuff Size: Medium Adult)   Pulse 52   Ht 1.753 m (5' 9\")   Wt 98.4 kg (217 lb)   SpO2 98%   BMI 32.05 kg/m²        
(5' 9\"), weight 98.4 kg (217 lb), SpO2 98%.            Gen: Well-developed, well-nourished, in no acute distress   Neck: Supple,No JVD, No Carotid Bruit,    Resp: No accessory muscle use, Clear breath sounds, No rales or rhonchi   Card: Irregular Rate,Rythm,Normal S1, S2, No murmurs, rubs or gallop. No thrills.    Abd:  Soft, BS+,    MSK: No cyanosis   Skin: No rashes     Neuro: moving all four extremities , follows commands appropriately   Psych:  Good insight, oriented to person, place , alert, Nml Affect   LE: No edema      EKG: Sinus bradycardia, IL WP,  anterior MI-age undetermined      LABS:                    ATTENTION:    This medical record was transcribed using an electronic medical records system.  Although proofread, it may and can contain electronic and spelling and other errors.  Corrections may be executed at a later time.  Please feel free to contact us for any  clarifications as needed.            Chris Nguyen MD

## 2025-04-10 NOTE — PATIENT INSTRUCTIONS
Patient Education        Learning About the Mediterranean Diet  What is the Mediterranean diet?     The Mediterranean diet is a style of eating rather than a diet plan. It features foods eaten in [place], [place], [place], and other countries along the [place] Sea. It emphasizes eating foods like fish, fruits, vegetables, beans, high-fiber breads and whole grains, nuts, and olive oil. This style of eating includes limited red meat, cheese, and sweets.  Why choose the Mediterranean diet?  A Mediterranean-style diet may improve heart health. It contains more fat than other heart-healthy diets. But the fats are mainly from nuts, unsaturated oils (such as fish oils and olive oil), and certain nut or seed oils (such as canola, soybean, or flaxseed oil). These fats may help protect the heart and blood vessels.  How can you get started on the Mediterranean diet?  Here are some things you can do to switch to a more Mediterranean way of eating.  What to eat  Eat a variety of fruits and vegetables each day, such as grapes, blueberries, tomatoes, broccoli, peppers, figs, olives, spinach, eggplant, beans, lentils, and chickpeas.  Eat a variety of whole-grain foods each day, such as oats, brown rice, and whole wheat bread, pasta, and couscous.  Eat fish at least 2 times a week. Try tuna, salmon, mackerel, lake trout, herring, or sardines.  Eat moderate amounts of low-fat dairy products, such as milk, cheese, or yogurt.  Eat moderate amounts of poultry and eggs.  Choose healthy (unsaturated) fats, such as nuts, olive oil, and certain nut or seed oils like canola, soybean, and flaxseed.  Limit unhealthy (saturated) fats, such as butter, palm oil, and coconut oil. And limit fats found in animal products, such as meat and dairy products made with whole milk. Try to eat red meat only a few times a month in very small amounts.  Limit sweets and desserts to only a few times a week. This includes sugar-sweetened

## 2025-05-13 ENCOUNTER — PATIENT MESSAGE (OUTPATIENT)
Facility: CLINIC | Age: 55
End: 2025-05-13

## 2025-05-13 ENCOUNTER — HOSPITAL ENCOUNTER (OUTPATIENT)
Facility: HOSPITAL | Age: 55
Discharge: HOME | End: 2025-05-16

## 2025-05-13 ENCOUNTER — OFFICE VISIT (OUTPATIENT)
Age: 55
End: 2025-05-13
Payer: COMMERCIAL

## 2025-05-13 VITALS
WEIGHT: 209 LBS | BODY MASS INDEX: 30.96 KG/M2 | OXYGEN SATURATION: 98 % | RESPIRATION RATE: 18 BRPM | DIASTOLIC BLOOD PRESSURE: 68 MMHG | HEIGHT: 69 IN | SYSTOLIC BLOOD PRESSURE: 100 MMHG | HEART RATE: 62 BPM

## 2025-05-13 DIAGNOSIS — I48.19 PERSISTENT ATRIAL FIBRILLATION (HCC): ICD-10-CM

## 2025-05-13 DIAGNOSIS — Z01.818 PREOP TESTING: ICD-10-CM

## 2025-05-13 DIAGNOSIS — I48.19 PERSISTENT ATRIAL FIBRILLATION (HCC): Primary | ICD-10-CM

## 2025-05-13 DIAGNOSIS — I49.3 VENTRICULAR PREMATURE DEPOLARIZATION: ICD-10-CM

## 2025-05-13 DIAGNOSIS — G47.33 OSA (OBSTRUCTIVE SLEEP APNEA): ICD-10-CM

## 2025-05-13 DIAGNOSIS — I48.0 PAROXYSMAL ATRIAL FIBRILLATION (HCC): ICD-10-CM

## 2025-05-13 DIAGNOSIS — I48.91 ATRIAL FIBRILLATION, UNSPECIFIED TYPE (HCC): ICD-10-CM

## 2025-05-13 LAB
ALBUMIN SERPL-MCNC: 4.1 G/DL (ref 3.5–5)
ALBUMIN/GLOB SERPL: 1.5 (ref 1.1–2.2)
ALP SERPL-CCNC: 77 U/L (ref 45–117)
ALT SERPL-CCNC: 21 U/L (ref 12–78)
AST SERPL-CCNC: 23 U/L (ref 15–37)
BILIRUB DIRECT SERPL-MCNC: 0.2 MG/DL (ref 0–0.2)
BILIRUB SERPL-MCNC: 0.6 MG/DL (ref 0.2–1)
GLOBULIN SER CALC-MCNC: 2.8 G/DL (ref 2–4)
MAGNESIUM SERPL-MCNC: 1.9 MG/DL (ref 1.6–2.4)
PROT SERPL-MCNC: 6.9 G/DL (ref 6.4–8.2)

## 2025-05-13 PROCEDURE — 93000 ELECTROCARDIOGRAM COMPLETE: CPT | Performed by: HOSPITALIST

## 2025-05-13 PROCEDURE — 99214 OFFICE O/P EST MOD 30 MIN: CPT | Performed by: HOSPITALIST

## 2025-05-13 NOTE — TELEPHONE ENCOUNTER
Called and scheduled patient for:    OFFICE VISIT at 11:15 AM (15 min)  Thursday August 28, 2025  Appointment Provider:Lia Mckeon APRN - NP in [facility]    [facility]

## 2025-05-13 NOTE — PROGRESS NOTES
Cardiac Electrophysiology OFFICE Consultation Note     Subjective:      Gilma Miller is a pleasant 54 y.o. female.  She is a resident of [facility]    Primary Cardiologist: Chris Nguyen MD    She works in customer service.    Gilma Miller presents to electrophysiology clinic for management of Atrial Arrythmias.    Her current medical conditions and PMH are detailed below.    Previous visit:  Date: 2/25/2025  She was initially diagnosed with AF in 2018 after GYN surgery. She followed up with Dr. Nguyen November 2024 routinely. She was again found to be in AF at that time. He stared her on amiodarone and she underwent cardioversion 1/2/25. She is on Eliquis and denies any bleeding issues.   Denies palpitations, dizziness, syncope and fatigue. Denies chest pain. Denies shortness of breath, paroxysmal nocturnal dyspnea, orthopnea and lower extremity edema.    Today's visit:  Date: 5/13/2025      Cardiac Rhythm Testing     All EKGs were personally interpreted by me as below    EKG from December 19, 2024 showed atrial fibrillation, ventricular rate 90 bpm  EKG reviewed from 2/25/2025 shows sinus bradycardia 53 bpm.  EKG from 5/13/2025 shows sinus bradycardia, 53 bpm,  ms          Assessment:       ICD-10-CM    1. Persistent atrial fibrillation (HCC)  I48.19 EKG 12 Lead     Hepatic Function Panel      2. YOVANA (obstructive sleep apnea)  G47.33 EKG 12 Lead     Hepatic Function Panel              # Early Persistent Atrial Fibrillation - maintaining sinus on Multaq  Onset: 3/2018 (incidental after surgery)   Symptoms: none  Alcohol: on vacation   YOVANA: not tested  Prior AAD: Amiodarone (stopped 2/25/25 and switched to Multaq)  Current Meds/AAD: Multaq  Prior Cardioversion: 1/2/25  Ablation: none  LA size: Mildly dilated. LA Vol Index A/L is 35 mL/m2 (echo 12/16/24)  -- December 2024: CTA showed no CAD with calcium score of 0.    -- Discussed rate control and rhythm control

## 2025-05-13 NOTE — PATIENT INSTRUCTIONS
--Stop Eliquis  --LFTs now and in 6 months  --Home Sleep Study  --We discussed the importance of exercise and losing weight. Encouraged increased physical activity as able and reduced caloric intake.  --See me in 6-7 months

## 2025-05-21 RX ORDER — LEVOTHYROXINE SODIUM 112 UG/1
112 TABLET ORAL
Qty: 90 TABLET | Refills: 0 | Status: SHIPPED | OUTPATIENT
Start: 2025-05-21

## 2025-05-22 RX ORDER — LEVOCETIRIZINE DIHYDROCHLORIDE 5 MG/1
5 TABLET, FILM COATED ORAL DAILY
Qty: 90 TABLET | Refills: 3 | Status: SHIPPED | OUTPATIENT
Start: 2025-05-22

## 2025-06-02 RX ORDER — TRAZODONE HYDROCHLORIDE 100 MG/1
100 TABLET ORAL NIGHTLY
Qty: 90 TABLET | Refills: 1 | Status: SHIPPED | OUTPATIENT
Start: 2025-06-02

## 2025-06-18 DIAGNOSIS — G47.33 OSA (OBSTRUCTIVE SLEEP APNEA): ICD-10-CM

## 2025-06-18 DIAGNOSIS — I48.19 PERSISTENT ATRIAL FIBRILLATION (HCC): Primary | ICD-10-CM

## 2025-07-29 ENCOUNTER — TELEPHONE (OUTPATIENT)
Age: 55
End: 2025-07-29

## 2025-08-16 RX ORDER — LEVOTHYROXINE SODIUM 112 MCG
112 TABLET ORAL
Qty: 90 TABLET | Refills: 0 | Status: SHIPPED | OUTPATIENT
Start: 2025-08-16

## 2025-08-20 RX ORDER — ATORVASTATIN CALCIUM 10 MG/1
10 TABLET, FILM COATED ORAL DAILY
Qty: 90 TABLET | Refills: 3 | Status: SHIPPED | OUTPATIENT
Start: 2025-08-20

## 2025-08-28 ENCOUNTER — OFFICE VISIT (OUTPATIENT)
Facility: CLINIC | Age: 55
End: 2025-08-28
Payer: COMMERCIAL

## 2025-08-28 VITALS
BODY MASS INDEX: 28.29 KG/M2 | SYSTOLIC BLOOD PRESSURE: 114 MMHG | DIASTOLIC BLOOD PRESSURE: 62 MMHG | WEIGHT: 191 LBS | OXYGEN SATURATION: 97 % | TEMPERATURE: 96.8 F | HEIGHT: 69 IN | HEART RATE: 58 BPM

## 2025-08-28 DIAGNOSIS — E87.6 HYPOKALEMIA: ICD-10-CM

## 2025-08-28 DIAGNOSIS — E78.00 HYPERCHOLESTEROLEMIA: ICD-10-CM

## 2025-08-28 DIAGNOSIS — E03.1 CONGENITAL HYPOTHYROIDISM WITHOUT GOITER: Primary | ICD-10-CM

## 2025-08-28 PROCEDURE — 99214 OFFICE O/P EST MOD 30 MIN: CPT | Performed by: NURSE PRACTITIONER

## 2025-08-28 RX ORDER — OLIVE LEAF EXTRACT 250 MG
1 CAPSULE ORAL DAILY
COMMUNITY

## 2025-08-29 LAB
ALBUMIN SERPL-MCNC: 4.4 G/DL (ref 3.8–4.9)
ALP SERPL-CCNC: 96 IU/L (ref 44–121)
ALT SERPL-CCNC: 14 IU/L (ref 0–32)
AST SERPL-CCNC: 21 IU/L (ref 0–40)
BILIRUB SERPL-MCNC: 0.5 MG/DL (ref 0–1.2)
BUN SERPL-MCNC: 18 MG/DL (ref 6–24)
BUN/CREAT SERPL: 16 (ref 9–23)
CALCIUM SERPL-MCNC: 9.6 MG/DL (ref 8.7–10.2)
CHLORIDE SERPL-SCNC: 102 MMOL/L (ref 96–106)
CHOLEST SERPL-MCNC: 130 MG/DL (ref 100–199)
CO2 SERPL-SCNC: 23 MMOL/L (ref 20–29)
CREAT SERPL-MCNC: 1.15 MG/DL (ref 0.57–1)
EGFRCR SERPLBLD CKD-EPI 2021: 56 ML/MIN/1.73
GLOBULIN SER CALC-MCNC: 2.4 G/DL (ref 1.5–4.5)
GLUCOSE SERPL-MCNC: 94 MG/DL (ref 70–99)
HDLC SERPL-MCNC: 43 MG/DL
LDLC SERPL CALC-MCNC: 64 MG/DL (ref 0–99)
POTASSIUM SERPL-SCNC: 4.7 MMOL/L (ref 3.5–5.2)
PROT SERPL-MCNC: 6.8 G/DL (ref 6–8.5)
SODIUM SERPL-SCNC: 140 MMOL/L (ref 134–144)
TRIGL SERPL-MCNC: 132 MG/DL (ref 0–149)
TSH SERPL DL<=0.005 MIU/L-ACNC: 0.01 UIU/ML (ref 0.45–4.5)
VLDLC SERPL CALC-MCNC: 23 MG/DL (ref 5–40)